# Patient Record
Sex: MALE | Race: WHITE | NOT HISPANIC OR LATINO | Employment: OTHER | URBAN - METROPOLITAN AREA
[De-identification: names, ages, dates, MRNs, and addresses within clinical notes are randomized per-mention and may not be internally consistent; named-entity substitution may affect disease eponyms.]

---

## 2023-08-26 PROBLEM — H57.9 ALLERGIC EYE REACTION: Status: ACTIVE | Noted: 2023-08-26

## 2023-08-26 PROBLEM — L30.9 ECZEMA: Status: ACTIVE | Noted: 2023-08-26

## 2023-08-26 PROBLEM — E87.1 HYPONATREMIA: Status: ACTIVE | Noted: 2023-08-26

## 2023-08-26 PROBLEM — Z86.010 HISTORY OF COLONIC POLYPS: Status: ACTIVE | Noted: 2023-08-26

## 2023-08-26 PROBLEM — I35.0 AORTIC STENOSIS, MILD: Status: ACTIVE | Noted: 2023-08-26

## 2023-08-26 PROBLEM — L03.119 CELLULITIS OF LOWER EXTREMITY: Status: ACTIVE | Noted: 2023-08-26

## 2023-08-26 PROBLEM — M26.629 TMJ ARTHRALGIA: Status: ACTIVE | Noted: 2023-08-26

## 2023-08-26 PROBLEM — B35.1 TINEA UNGUIUM: Status: ACTIVE | Noted: 2017-01-20

## 2023-08-26 PROBLEM — E66.812 CLASS 2 OBESITY: Status: ACTIVE | Noted: 2023-08-26

## 2023-08-26 PROBLEM — I48.3 TYPICAL ATRIAL FLUTTER (MULTI): Status: ACTIVE | Noted: 2023-08-26

## 2023-08-26 PROBLEM — I48.91 RAPID ATRIAL FIBRILLATION (MULTI): Status: ACTIVE | Noted: 2023-08-26

## 2023-08-26 PROBLEM — I27.20 PULMONARY HYPERTENSION (MULTI): Status: ACTIVE | Noted: 2023-08-26

## 2023-08-26 PROBLEM — S80.12XA CONTUSION OF LEFT LOWER LEG: Status: ACTIVE | Noted: 2023-08-26

## 2023-08-26 PROBLEM — H04.129 DRY EYE SYNDROME: Status: ACTIVE | Noted: 2023-08-26

## 2023-08-26 PROBLEM — E78.2 MIXED HYPERLIPIDEMIA: Status: ACTIVE | Noted: 2023-08-26

## 2023-08-26 PROBLEM — I71.40 ABDOMINAL AORTIC ANEURYSM (AAA) WITHOUT RUPTURE (CMS-HCC): Status: ACTIVE | Noted: 2023-08-26

## 2023-08-26 PROBLEM — B35.3 TINEA PEDIS OF BOTH FEET: Status: ACTIVE | Noted: 2017-01-20

## 2023-08-26 PROBLEM — M19.90 DEGENERATIVE JOINT DISEASE: Status: ACTIVE | Noted: 2023-08-26

## 2023-08-26 PROBLEM — I10 BENIGN ESSENTIAL HYPERTENSION: Status: ACTIVE | Noted: 2023-08-26

## 2023-08-26 PROBLEM — I10 HTN (HYPERTENSION): Status: ACTIVE | Noted: 2023-08-26

## 2023-08-26 PROBLEM — N40.0 BENIGN PROSTATIC HYPERPLASIA WITHOUT URINARY OBSTRUCTION: Status: ACTIVE | Noted: 2023-08-26

## 2023-08-26 PROBLEM — G47.19 EXCESSIVE DAYTIME SLEEPINESS: Status: ACTIVE | Noted: 2023-08-26

## 2023-08-26 PROBLEM — E66.9 CLASS 2 OBESITY: Status: ACTIVE | Noted: 2023-08-26

## 2023-08-26 PROBLEM — G47.30 SLEEP-DISORDERED BREATHING: Status: ACTIVE | Noted: 2023-08-26

## 2023-08-26 PROBLEM — I34.0 MILD MITRAL REGURGITATION: Status: ACTIVE | Noted: 2023-08-26

## 2023-08-26 PROBLEM — I49.3 PREMATURE VENTRICULAR CONTRACTIONS: Status: ACTIVE | Noted: 2023-08-26

## 2023-08-26 PROBLEM — I71.60 THORACOABDOMINAL AORTIC ANEURYSM (CMS-HCC): Status: ACTIVE | Noted: 2023-08-26

## 2023-08-26 PROBLEM — G47.33 OSA (OBSTRUCTIVE SLEEP APNEA): Status: ACTIVE | Noted: 2023-08-26

## 2023-08-26 PROBLEM — L85.3 XEROSIS CUTIS: Status: ACTIVE | Noted: 2017-01-20

## 2023-08-26 PROBLEM — D69.6 THROMBOCYTOPENIA (CMS-HCC): Status: ACTIVE | Noted: 2023-08-26

## 2023-08-26 PROBLEM — S61.210A LACERATION OF RIGHT INDEX FINGER, INITIAL ENCOUNTER: Status: ACTIVE | Noted: 2023-08-26

## 2023-08-26 PROBLEM — I49.9 CARDIAC RHYTHM DISORDER OR DISTURBANCE OR CHANGE: Status: ACTIVE | Noted: 2023-08-26

## 2023-08-26 PROBLEM — I48.0 PAROXYSMAL ATRIAL FIBRILLATION (MULTI): Status: ACTIVE | Noted: 2023-08-26

## 2023-08-26 PROBLEM — R09.89 PULSE IRREGULARITY: Status: ACTIVE | Noted: 2023-08-26

## 2023-08-26 PROBLEM — L08.9 INFECTION OF SKIN: Status: ACTIVE | Noted: 2023-08-26

## 2023-08-26 PROBLEM — S20.212A CONTUSION OF RIB ON LEFT SIDE: Status: ACTIVE | Noted: 2023-08-26

## 2023-08-26 PROBLEM — N52.9 ERECTILE DYSFUNCTION: Status: ACTIVE | Noted: 2023-08-26

## 2023-08-26 PROBLEM — S90.02XA: Status: ACTIVE | Noted: 2023-08-26

## 2023-08-26 PROBLEM — R91.8 MULTIPLE PULMONARY NODULES: Status: ACTIVE | Noted: 2023-08-26

## 2023-08-26 PROBLEM — J30.9 ALLERGIC RHINITIS DUE TO ALLERGEN: Status: ACTIVE | Noted: 2023-08-26

## 2023-08-26 PROBLEM — I51.7 CARDIOMEGALY: Status: ACTIVE | Noted: 2023-08-26

## 2023-08-26 PROBLEM — Z86.0100 HISTORY OF COLONIC POLYPS: Status: ACTIVE | Noted: 2023-08-26

## 2023-08-26 PROBLEM — K64.8 INTERNAL HEMORRHOIDS: Status: ACTIVE | Noted: 2023-08-26

## 2023-08-26 RX ORDER — LOSARTAN POTASSIUM 50 MG/1
50 TABLET ORAL DAILY
COMMUNITY
Start: 2022-04-22 | End: 2023-10-27 | Stop reason: SDUPTHER

## 2023-08-26 RX ORDER — METOPROLOL TARTRATE 100 MG/1
50 TABLET ORAL 3 TIMES DAILY
COMMUNITY
Start: 2017-12-01 | End: 2023-10-27 | Stop reason: SDUPTHER

## 2023-08-26 RX ORDER — AMIODARONE HYDROCHLORIDE 100 MG/1
100 TABLET ORAL
COMMUNITY
End: 2023-10-27 | Stop reason: SDUPTHER

## 2023-08-26 RX ORDER — SILDENAFIL 100 MG/1
100 TABLET, FILM COATED ORAL DAILY PRN
COMMUNITY
Start: 2022-04-22 | End: 2023-11-21 | Stop reason: ALTCHOICE

## 2023-08-26 RX ORDER — HYDROCORTISONE VALERATE CREAM 2 MG/G
1 CREAM TOPICAL 3 TIMES DAILY
COMMUNITY
Start: 2019-02-08 | End: 2023-10-27 | Stop reason: WASHOUT

## 2023-08-26 RX ORDER — METOPROLOL TARTRATE 50 MG/1
50 TABLET ORAL 3 TIMES DAILY
COMMUNITY
End: 2023-10-27 | Stop reason: SDUPTHER

## 2023-08-26 RX ORDER — MUPIROCIN 20 MG/G
OINTMENT TOPICAL
COMMUNITY
Start: 2023-02-01 | End: 2023-10-27 | Stop reason: WASHOUT

## 2023-08-26 RX ORDER — AMIODARONE HYDROCHLORIDE 200 MG/1
200 TABLET ORAL
COMMUNITY
Start: 2021-10-22 | End: 2023-10-27 | Stop reason: SDUPTHER

## 2023-08-26 RX ORDER — ACETAMINOPHEN 500 MG
500 TABLET ORAL EVERY 4 HOURS PRN
COMMUNITY
Start: 2020-12-08 | End: 2024-05-03 | Stop reason: SDUPTHER

## 2023-08-26 RX ORDER — ACETAMINOPHEN, ASPIRIN (NSAID), AND CAFFEINE 250; 250; 65 MG/1; MG/1; MG/1
2 TABLET, FILM COATED ORAL DAILY
COMMUNITY
Start: 2021-11-09 | End: 2024-05-03 | Stop reason: ALTCHOICE

## 2023-08-26 RX ORDER — PRAVASTATIN SODIUM 40 MG/1
40 TABLET ORAL DAILY
COMMUNITY
Start: 2021-10-22 | End: 2023-10-27 | Stop reason: SDUPTHER

## 2023-08-26 RX ORDER — APIXABAN 5 MG/1
5 TABLET, FILM COATED ORAL 2 TIMES DAILY
COMMUNITY
End: 2023-10-27 | Stop reason: SDUPTHER

## 2023-08-26 RX ORDER — KETOCONAZOLE 20 MG/G
1 CREAM TOPICAL
COMMUNITY
Start: 2017-01-20 | End: 2023-10-27 | Stop reason: WASHOUT

## 2023-08-26 RX ORDER — WARFARIN 4 MG/1
TABLET ORAL
COMMUNITY
Start: 2017-12-06 | End: 2023-10-27 | Stop reason: WASHOUT

## 2023-10-27 ENCOUNTER — OFFICE VISIT (OUTPATIENT)
Dept: PRIMARY CARE | Facility: CLINIC | Age: 73
End: 2023-10-27
Payer: MEDICARE

## 2023-10-27 VITALS
SYSTOLIC BLOOD PRESSURE: 138 MMHG | OXYGEN SATURATION: 98 % | TEMPERATURE: 97.7 F | WEIGHT: 252 LBS | BODY MASS INDEX: 36.16 KG/M2 | HEART RATE: 49 BPM | DIASTOLIC BLOOD PRESSURE: 60 MMHG

## 2023-10-27 DIAGNOSIS — Z79.899 LONG TERM CURRENT USE OF AMIODARONE: ICD-10-CM

## 2023-10-27 DIAGNOSIS — Z12.12 ENCOUNTER FOR COLORECTAL CANCER SCREENING: ICD-10-CM

## 2023-10-27 DIAGNOSIS — N40.0 BENIGN PROSTATIC HYPERPLASIA WITHOUT URINARY OBSTRUCTION: ICD-10-CM

## 2023-10-27 DIAGNOSIS — Z00.00 ANNUAL PHYSICAL EXAM: Primary | ICD-10-CM

## 2023-10-27 DIAGNOSIS — I48.0 PAROXYSMAL ATRIAL FIBRILLATION (MULTI): ICD-10-CM

## 2023-10-27 DIAGNOSIS — Z79.01 LONG TERM CURRENT USE OF ANTICOAGULANT: ICD-10-CM

## 2023-10-27 DIAGNOSIS — G47.33 OSA (OBSTRUCTIVE SLEEP APNEA): ICD-10-CM

## 2023-10-27 DIAGNOSIS — E03.8 SUBCLINICAL HYPOTHYROIDISM: ICD-10-CM

## 2023-10-27 DIAGNOSIS — Z12.11 ENCOUNTER FOR COLORECTAL CANCER SCREENING: ICD-10-CM

## 2023-10-27 DIAGNOSIS — M15.9 PRIMARY OSTEOARTHRITIS INVOLVING MULTIPLE JOINTS: ICD-10-CM

## 2023-10-27 DIAGNOSIS — Z23 ENCOUNTER FOR IMMUNIZATION: ICD-10-CM

## 2023-10-27 DIAGNOSIS — Z01.89 ENCOUNTER FOR ROUTINE LABORATORY TESTING: ICD-10-CM

## 2023-10-27 DIAGNOSIS — E78.2 MIXED HYPERLIPIDEMIA: ICD-10-CM

## 2023-10-27 DIAGNOSIS — E55.9 VITAMIN D DEFICIENCY: ICD-10-CM

## 2023-10-27 DIAGNOSIS — I10 ESSENTIAL HYPERTENSION: ICD-10-CM

## 2023-10-27 DIAGNOSIS — R73.9 HYPERGLYCEMIA: ICD-10-CM

## 2023-10-27 DIAGNOSIS — D69.6 THROMBOCYTOPENIA (CMS-HCC): ICD-10-CM

## 2023-10-27 DIAGNOSIS — I27.20 PULMONARY HYPERTENSION (MULTI): ICD-10-CM

## 2023-10-27 DIAGNOSIS — Z71.89 COUNSELING REGARDING ADVANCED CARE PLANNING AND GOALS OF CARE: ICD-10-CM

## 2023-10-27 DIAGNOSIS — Z12.5 ENCOUNTER FOR PROSTATE CANCER SCREENING: ICD-10-CM

## 2023-10-27 PROBLEM — I48.91 RAPID ATRIAL FIBRILLATION (MULTI): Status: RESOLVED | Noted: 2023-08-26 | Resolved: 2023-10-27

## 2023-10-27 PROBLEM — S20.212A CONTUSION OF RIB ON LEFT SIDE: Status: RESOLVED | Noted: 2023-08-26 | Resolved: 2023-10-27

## 2023-10-27 PROBLEM — M19.90 DEGENERATIVE JOINT DISEASE: Status: RESOLVED | Noted: 2023-08-26 | Resolved: 2023-10-27

## 2023-10-27 PROBLEM — R09.89 PULSE IRREGULARITY: Status: RESOLVED | Noted: 2023-08-26 | Resolved: 2023-10-27

## 2023-10-27 PROBLEM — G47.30 SLEEP-DISORDERED BREATHING: Status: RESOLVED | Noted: 2023-08-26 | Resolved: 2023-10-27

## 2023-10-27 PROBLEM — I51.7 CARDIOMEGALY: Status: RESOLVED | Noted: 2023-08-26 | Resolved: 2023-10-27

## 2023-10-27 PROBLEM — L03.119 CELLULITIS OF LOWER EXTREMITY: Status: RESOLVED | Noted: 2023-08-26 | Resolved: 2023-10-27

## 2023-10-27 PROBLEM — M15.0 PRIMARY OSTEOARTHRITIS INVOLVING MULTIPLE JOINTS: Status: ACTIVE | Noted: 2023-10-27

## 2023-10-27 PROBLEM — G47.19 EXCESSIVE DAYTIME SLEEPINESS: Status: RESOLVED | Noted: 2023-08-26 | Resolved: 2023-10-27

## 2023-10-27 PROBLEM — I49.9 CARDIAC RHYTHM DISORDER OR DISTURBANCE OR CHANGE: Status: RESOLVED | Noted: 2023-08-26 | Resolved: 2023-10-27

## 2023-10-27 PROBLEM — S80.12XA CONTUSION OF LEFT LOWER LEG: Status: RESOLVED | Noted: 2023-08-26 | Resolved: 2023-10-27

## 2023-10-27 PROBLEM — L08.9 INFECTION OF SKIN: Status: RESOLVED | Noted: 2023-08-26 | Resolved: 2023-10-27

## 2023-10-27 PROBLEM — S90.02XA: Status: RESOLVED | Noted: 2023-08-26 | Resolved: 2023-10-27

## 2023-10-27 PROCEDURE — 1159F MED LIST DOCD IN RCRD: CPT | Performed by: STUDENT IN AN ORGANIZED HEALTH CARE EDUCATION/TRAINING PROGRAM

## 2023-10-27 PROCEDURE — 1160F RVW MEDS BY RX/DR IN RCRD: CPT | Performed by: STUDENT IN AN ORGANIZED HEALTH CARE EDUCATION/TRAINING PROGRAM

## 2023-10-27 PROCEDURE — G0008 ADMIN INFLUENZA VIRUS VAC: HCPCS | Performed by: STUDENT IN AN ORGANIZED HEALTH CARE EDUCATION/TRAINING PROGRAM

## 2023-10-27 PROCEDURE — G0439 PPPS, SUBSEQ VISIT: HCPCS | Performed by: STUDENT IN AN ORGANIZED HEALTH CARE EDUCATION/TRAINING PROGRAM

## 2023-10-27 PROCEDURE — 1036F TOBACCO NON-USER: CPT | Performed by: STUDENT IN AN ORGANIZED HEALTH CARE EDUCATION/TRAINING PROGRAM

## 2023-10-27 PROCEDURE — 1126F AMNT PAIN NOTED NONE PRSNT: CPT | Performed by: STUDENT IN AN ORGANIZED HEALTH CARE EDUCATION/TRAINING PROGRAM

## 2023-10-27 PROCEDURE — 3078F DIAST BP <80 MM HG: CPT | Performed by: STUDENT IN AN ORGANIZED HEALTH CARE EDUCATION/TRAINING PROGRAM

## 2023-10-27 PROCEDURE — 1170F FXNL STATUS ASSESSED: CPT | Performed by: STUDENT IN AN ORGANIZED HEALTH CARE EDUCATION/TRAINING PROGRAM

## 2023-10-27 PROCEDURE — 90662 IIV NO PRSV INCREASED AG IM: CPT | Performed by: STUDENT IN AN ORGANIZED HEALTH CARE EDUCATION/TRAINING PROGRAM

## 2023-10-27 PROCEDURE — 3075F SYST BP GE 130 - 139MM HG: CPT | Performed by: STUDENT IN AN ORGANIZED HEALTH CARE EDUCATION/TRAINING PROGRAM

## 2023-10-27 RX ORDER — AMIODARONE HYDROCHLORIDE 200 MG/1
TABLET ORAL
Start: 2023-10-27 | End: 2024-05-03 | Stop reason: SDUPTHER

## 2023-10-27 RX ORDER — TRIAMCINOLONE ACETONIDE 1 MG/G
CREAM TOPICAL
COMMUNITY
Start: 2023-07-24

## 2023-10-27 RX ORDER — PRAVASTATIN SODIUM 40 MG/1
40 TABLET ORAL NIGHTLY
Start: 2023-10-27 | End: 2023-11-29 | Stop reason: SDUPTHER

## 2023-10-27 RX ORDER — LOSARTAN POTASSIUM 50 MG/1
50 TABLET ORAL DAILY
Start: 2023-10-27 | End: 2024-02-28

## 2023-10-27 RX ORDER — VIT C/E/ZN/COPPR/LUTEIN/ZEAXAN 250MG-90MG
25 CAPSULE ORAL DAILY
Start: 2023-10-27 | End: 2024-05-03 | Stop reason: SDUPTHER

## 2023-10-27 RX ORDER — METOPROLOL TARTRATE 50 MG/1
50 TABLET ORAL 3 TIMES DAILY
Start: 2023-10-27 | End: 2024-05-03 | Stop reason: ALTCHOICE

## 2023-10-27 ASSESSMENT — ACTIVITIES OF DAILY LIVING (ADL)
TAKING_MEDICATION: INDEPENDENT
DRESSING: INDEPENDENT
MANAGING_FINANCES: INDEPENDENT
GROCERY_SHOPPING: INDEPENDENT
BATHING: INDEPENDENT
DOING_HOUSEWORK: INDEPENDENT

## 2023-10-27 ASSESSMENT — ENCOUNTER SYMPTOMS
NEUROLOGICAL NEGATIVE: 1
CONSTITUTIONAL NEGATIVE: 1
ALLERGIC/IMMUNOLOGIC NEGATIVE: 1
RESPIRATORY NEGATIVE: 1
GASTROINTESTINAL NEGATIVE: 1
HEMATOLOGIC/LYMPHATIC NEGATIVE: 1
PSYCHIATRIC NEGATIVE: 1
EYES NEGATIVE: 1
CARDIOVASCULAR NEGATIVE: 1
MUSCULOSKELETAL NEGATIVE: 1
ENDOCRINE NEGATIVE: 1

## 2023-10-27 ASSESSMENT — PAIN SCALES - GENERAL: PAINLEVEL: 0-NO PAIN

## 2023-10-27 NOTE — PATIENT INSTRUCTIONS
Diagnoses and all orders for this visit:  Annual physical exam  Encounter for routine laboratory testing  Comments:  Patient had labwork done for this appointment. Discussed today.  Will order labwork for next appointment.  Orders:  -     CBC; Future  -     Basic Metabolic Panel; Future  -     Hepatic Function Panel; Future  -     Lipid Panel; Future  -     Hemoglobin A1C; Future  -     Vitamin D 25-Hydroxy,Total (for eval of Vitamin D levels); Future  -     Prostate Specific Antigen; Future  -     TSH with reflex to Free T4 if abnormal; Future  Hyperglycemia  -     Hemoglobin A1C; Future  Encounter for prostate cancer screening  -     Prostate Specific Antigen; Future  Encounter for colorectal cancer screening  Encounter for immunization  Comments:  Encouraged shingles (shingrix) immunizations.  Orders:  -     Flu vaccine, quadrivalent, high-dose, preservative free, age 65y+ (FLUZONE)  Counseling regarding advanced care planning and goals of care  Comments:  The patient was encouraged to ensure that any/all documentation is accurate, and that our office be provided a copy in the event that anything changes.  Essential hypertension  Comments:  Looks great, no changes at this time.  Orders:  -     losartan (Cozaar) 50 mg tablet; Take 1 tablet (50 mg) by mouth once daily.  -     metoprolol tartrate (Lopressor) 50 mg tablet; Take 1 tablet by mouth 3 times a day.  -     Follow Up In Primary Care; Future  JAYDEN (obstructive sleep apnea)  -     Follow Up In Primary Care; Future  Pulmonary hypertension (CMS/HCC)  -     Follow Up In Primary Care; Future  Mixed hyperlipidemia  -     pravastatin (Pravachol) 40 mg tablet; Take 1 tablet (40 mg) by mouth once daily at bedtime.  -     Lipid Panel; Future  Subclinical hypothyroidism  -     TSH with reflex to Free T4 if abnormal; Future  Benign prostatic hyperplasia without urinary obstruction  -     Prostate Specific Antigen; Future  -     Follow Up In Primary Care; Future  Primary  osteoarthritis involving multiple joints  -     Follow Up In Primary Care; Future  Paroxysmal atrial fibrillation (CMS/HCC)  -     amiodarone (Pacerone) 200 mg tablet; 1 tablet once/day every Tuesday, Thursday, Saturday, and Sunday.  -     apixaban (Eliquis) 5 mg tablet; Take 1 tablet (5 mg) by mouth 2 times a day.  -     metoprolol tartrate (Lopressor) 50 mg tablet; Take 1 tablet by mouth 3 times a day.  -     Follow Up In Primary Care; Future  Long term current use of anticoagulant  -     apixaban (Eliquis) 5 mg tablet; Take 1 tablet (5 mg) by mouth 2 times a day.  -     Follow Up In Primary Care; Future  Long term current use of amiodarone  -     amiodarone (Pacerone) 200 mg tablet; 1 tablet once/day every Tuesday, Thursday, Saturday, and Sunday.  -     Follow Up In Primary Care; Future  Thrombocytopenia (CMS/HCC)  -     CBC; Future  Vitamin D deficiency  -     Vitamin D 25-Hydroxy,Total (for eval of Vitamin D levels); Future  -     cholecalciferol (Vitamin D3) 25 MCG (1000 UT) capsule; Take 1 capsule (25 mcg) by mouth once daily.

## 2023-10-27 NOTE — PROGRESS NOTES
[facility]: [facility]  MEDICARE WELLNESS EXAM      Anderson Barlow is a 73 y.o. male that is presenting today for Annual Exam (CPE).    Assessment/Plan    Diagnoses and all orders for this visit:  Annual physical exam  Encounter for routine laboratory testing  Comments:  Patient had labwork done for this appointment. Discussed today.  Will order labwork for next appointment.  Orders:  -     CBC; Future  -     Basic Metabolic Panel; Future  -     Hepatic Function Panel; Future  -     Lipid Panel; Future  -     Hemoglobin A1C; Future  -     Vitamin D 25-Hydroxy,Total (for eval of Vitamin D levels); Future  -     Prostate Specific Antigen; Future  -     TSH with reflex to Free T4 if abnormal; Future  Hyperglycemia  -     Hemoglobin A1C; Future  Encounter for prostate cancer screening  -     Prostate Specific Antigen; Future  Encounter for colorectal cancer screening  Encounter for immunization  Comments:  Encouraged shingles (shingrix) immunizations.  Orders:  -     Flu vaccine, quadrivalent, high-dose, preservative free, age 65y+ (FLUZONE)  Counseling regarding advanced care planning and goals of care  Comments:  The patient was encouraged to ensure that any/all documentation is accurate, and that our office be provided a copy in the event that anything changes.  Essential hypertension  Comments:  Looks great, no changes at this time.  Orders:  -     losartan (Cozaar) 50 mg tablet; Take 1 tablet (50 mg) by mouth once daily.  -     metoprolol tartrate (Lopressor) 50 mg tablet; Take 1 tablet by mouth 3 times a day.  JAYDEN (obstructive sleep apnea)  Pulmonary hypertension (CMS/HCC)  Mixed hyperlipidemia  -     pravastatin (Pravachol) 40 mg tablet; Take 1 tablet (40 mg) by mouth once daily at bedtime.  -     Lipid Panel; Future  Subclinical hypothyroidism  -     TSH with reflex to Free T4 if abnormal; Future  Benign prostatic hyperplasia without urinary obstruction  -     Prostate Specific Antigen;  Future  Primary osteoarthritis involving multiple joints  Paroxysmal atrial fibrillation (CMS/[facility])  -     amiodarone (Pacerone) 200 mg tablet; 1 tablet once/day every Tuesday, Thursday, Saturday, and Sunday.  -     apixaban (Eliquis) 5 mg tablet; Take 1 tablet (5 mg) by mouth 2 times a day.  -     metoprolol tartrate (Lopressor) 50 mg tablet; Take 1 tablet by mouth 3 times a day.  Long term current use of anticoagulant  -     apixaban (Eliquis) 5 mg tablet; Take 1 tablet (5 mg) by mouth 2 times a day.  Long term current use of amiodarone  -     amiodarone (Pacerone) 200 mg tablet; 1 tablet once/day every Tuesday, Thursday, Saturday, and Sunday.  Thrombocytopenia (CMS/[facility])  -     CBC; Future  Vitamin D deficiency  -     Vitamin D 25-Hydroxy,Total (for eval of Vitamin D levels); Future  -     cholecalciferol (Vitamin D3) 25 MCG (1000 UT) capsule; Take 1 capsule (25 mcg) by mouth once daily.    ADVANCED CARE PLANNING  Advanced Care Planning was discussed with patient:  The patient has an active advanced care plan on file. The patient has an active surrogate decision-maker on file.  The patient was encouraged to ensure that any/all documentation is accurate and up to date, and that our office be provided a copy in the event that anything changes.    ACTIVITIES OF DAILY LIVING  Basic ADLs:  Bathing: Independent, Dressing: Independent, Toileting: Independent, Transferring: Independent, Continence: Independent, Feeding: Independent.    Instrumental ADLs:  Ability to use phone: Independent, Shopping: Independent, Cooking: Independent, House-keeping: Independent, Laundry: Independent, Transportation: Independent, Medication Management: Independent, Finance Management: Independent.    Subjective   - The patient otherwise feels well and denies any acute symptoms or concerns at this time.  - The patient denies any changes or progression of their chronic medical problems.  - The patient denies any problems or concerns with their  medications.      Review of Systems   Constitutional: Negative.    HENT: Negative.     Eyes: Negative.    Respiratory: Negative.     Cardiovascular: Negative.    Gastrointestinal: Negative.    Endocrine: Negative.    Genitourinary: Negative.    Musculoskeletal: Negative.    Skin: Negative.    Allergic/Immunologic: Negative.    Neurological: Negative.    Hematological: Negative.    Psychiatric/Behavioral: Negative.     All other systems reviewed and are negative.    Objective   Vitals:    10/27/23 0937   BP: 138/60   Pulse: (!) 49   Temp: 36.5 °C (97.7 °F)   SpO2: 98%      Body mass index is 36.16 kg/m².  Physical Exam  Vitals and nursing note reviewed.   Constitutional:       General: He is not in acute distress.     Appearance: Normal appearance. He is not ill-appearing.   HENT:      Head: Normocephalic and atraumatic.      Right Ear: Tympanic membrane, ear canal and external ear normal. There is no impacted cerumen.      Left Ear: Tympanic membrane, ear canal and external ear normal. There is no impacted cerumen.      Nose: Nose normal.      Mouth/Throat:      Mouth: Mucous membranes are moist.      Pharynx: Oropharynx is clear. No oropharyngeal exudate or posterior oropharyngeal erythema.   Eyes:      General: No scleral icterus.        Right eye: No discharge.         Left eye: No discharge.      Extraocular Movements: Extraocular movements intact.      Conjunctiva/sclera: Conjunctivae normal.      Pupils: Pupils are equal, round, and reactive to light.   Neck:      Vascular: No carotid bruit.   Cardiovascular:      Rate and Rhythm: Normal rate and regular rhythm.      Pulses: Normal pulses.      Heart sounds: Normal heart sounds. No murmur heard.     No friction rub. No gallop.   Pulmonary:      Effort: Pulmonary effort is normal. No respiratory distress.      Breath sounds: Normal breath sounds.   Abdominal:      General: Abdomen is flat. Bowel sounds are normal.      Palpations: Abdomen is soft.       "Tenderness: There is no abdominal tenderness.      Hernia: No hernia is present.   Musculoskeletal:         General: No swelling. Normal range of motion.      Cervical back: Normal range of motion.   Lymphadenopathy:      Cervical: No cervical adenopathy.   Skin:     General: Skin is warm and dry.      Coloration: Skin is not jaundiced.      Findings: No rash.   Neurological:      General: No focal deficit present.      Mental Status: He is alert and oriented to person, place, and time. Mental status is at baseline.   Psychiatric:         Mood and Affect: Mood normal.         Behavior: Behavior normal.       Diagnostic Results   Lab Results   Component Value Date    GLUCOSE 142 (H) 04/12/2023    CALCIUM 8.8 04/12/2023     04/12/2023    K 4.1 04/12/2023    CO2 23 (L) 04/12/2023     04/12/2023    BUN 18 04/12/2023    CREATININE 1.1 04/12/2023     Lab Results   Component Value Date    ALT 18 04/12/2023    AST 21 04/12/2023    ALKPHOS 98 04/12/2023    BILITOT 0.4 04/12/2023     Lab Results   Component Value Date    WBC 7.0 02/07/2023    HGB 14.6 02/07/2023    HCT 45.6 02/07/2023    MCV 93.6 02/07/2023     02/07/2023     Lab Results   Component Value Date    CHOL 158 03/03/2021    CHOL 163 01/06/2021     Lab Results   Component Value Date    HDL 37 (L) 03/03/2021    HDL 40 (L) 01/06/2021     Lab Results   Component Value Date    LDLCALC 97 03/03/2021    LDLCALC 99 01/06/2021     Lab Results   Component Value Date    TRIG 122 03/03/2021    TRIG 119 01/06/2021     No components found for: \"CHOLHDL\"  No results found for: \"HGBA1C\"  Other labs not included in the list above reviewed either before or during this encounter.    History   History reviewed. No pertinent past medical history.  Past Surgical History:   Procedure Laterality Date    CATARACT EXTRACTION  06/23/2016    Cataract Surgery    COLONOSCOPY  06/23/2016    Complete Colonoscopy    CT ABDOMEN PELVIS ANGIOGRAM W AND/OR WO IV CONTRAST  11/4/2020 " "   CT ABDOMEN PELVIS ANGIOGRAM W AND/OR WO IV CONTRAST [facility]    HEMORRHOID SURGERY  06/23/2016    Hemorrhoidectomy    OTHER SURGICAL HISTORY  03/28/2022    Abdominal aortic aneurysm repair     Family History   Problem Relation Name Age of Onset    Alzheimer's disease Mother      Liver cancer Other SIBLINGS      Social History     Socioeconomic History    Marital status:      Spouse name: Not on file    Number of children: Not on file    Years of education: Not on file    Highest education level: Not on file   Occupational History    Not on file   Tobacco Use    Smoking status: Former     Types: Cigarettes    Smokeless tobacco: Never    Tobacco comments:     Pt quit about 10 years ago   Substance and Sexual Activity    Alcohol use: Not Currently    Drug use: Never    Sexual activity: Not on file   Other Topics Concern    Not on file   Social History Narrative    Not on file     Social Determinants of Health     Financial Resource Strain: Not on file   Food Insecurity: Not on file   Transportation Needs: Not on file   Physical Activity: Not on file   Stress: Not on file   Social Connections: Not on file   Intimate Partner Violence: Not on file   Housing Stability: Not on file     Allergies   Allergen Reactions    Codeine Unknown    Hydrocodone Other     MENTAL STATUS CHANGE    Hydrocodone-Acetaminophen Other     AMS    Lisinopril Other and Unknown     CHOKING    Oxycodone-Acetaminophen Unknown    Succinylcholine Other and Unknown     \"CAUSED FATHER'S PROSTATE CANCER\"    Cephalexin Diarrhea and Palpitations     VOMITING  HEART RACING  FACE FLUSHING    Penicillins Rash     Current Outpatient Medications on File Prior to Visit   Medication Sig Dispense Refill    acetaminophen (Tylenol Extra Strength) 500 mg tablet Take 1 tablet (500 mg) by mouth every 4 hours if needed.      aspirin-acetaminophen-caffeine (Excedrin Extra Strength) 250-250-65 mg tablet Take 2 tablets by mouth once daily.      " sildenafil (Viagra) 100 mg tablet Take 1 tablet (100 mg) by mouth once daily as needed.      triamcinolone (Kenalog) 0.1 % cream APPLY TWICE DAILY TO RASH UP TO 2 WEEKS/MONTH AS NEEDED.      [DISCONTINUED] amiodarone (Pacerone) 200 mg tablet Take 1 tablet (200 mg) by mouth. EVERY TUESDAY, THURSDAY, SATURDAY, SUNDAY      [DISCONTINUED] Eliquis 5 mg tablet Take 1 tablet (5 mg) by mouth 2 times a day.      [DISCONTINUED] losartan (Cozaar) 50 mg tablet Take 1 tablet (50 mg) by mouth once daily.      [DISCONTINUED] metoprolol tartrate (Lopressor) 50 mg tablet Take 1 tablet by mouth 3 times a day.      [DISCONTINUED] pravastatin (Pravachol) 40 mg tablet Take 1 tablet (40 mg) by mouth once daily.      [DISCONTINUED] amiodarone (Pacerone) 100 mg tablet Take 1 tablet (100 mg) by mouth. EVERY MONDAY, WEDNESDAY, FRIDAY      [DISCONTINUED] hydrocortisone ([place]) 0.2 % cream Apply 1 Application topically 3 times a day. APPLY SPARINGLY TO AFFECTED AREA      [DISCONTINUED] ketoconazole (NIZOral) 2 % cream 1 Application.      [DISCONTINUED] metoprolol tartrate (Lopressor) 100 mg tablet Take 0.5 tablets (50 mg) by mouth 3 times a day.      [DISCONTINUED] mupirocin (Bactroban) 2 % ointment       [DISCONTINUED] warfarin (Coumadin) 4 mg tablet Take by mouth. TAKE as DIRECTED       No current facility-administered medications on file prior to visit.     Immunization History   Administered Date(s) Administered    Flu vaccine, quadrivalent, high-dose, preservative free, age 65y+ (FLUZONE) 10/22/2021    Influenza, High Dose Seasonal, Preservative Free 10/19/2016, 02/09/2017, 11/05/2019    Influenza, Seasonal, Quadrivalent, Adjuvanted 10/13/2020    Influenza, seasonal, injectable 11/24/2007, 09/03/2009, 11/11/2010, 10/31/2011, 09/25/2012, 10/23/2013, 11/12/2014, 11/12/2015, 10/24/2016    Influenza, trivalent, adjuvanted 11/20/2017, 10/29/2018    Pfizer Purple Cap SARS-CoV-2 03/05/2021, 04/02/2021, 01/01/2022, 01/20/2022    Pneumococcal  conjugate vaccine, 13-valent (PREVNAR 13) 08/17/2015    Pneumococcal polysaccharide vaccine, 23-valent, age 2 years and older (PNEUMOVAX 23) 10/23/2013    Td vaccine, age 7 years and older (TDVAX) 12/30/1994    Tdap vaccine, age 7 year and older (BOOSTRIX) 09/06/2012, 01/01/2014, 11/29/2017    Zoster, live 09/03/2009     Patient's medical history was reviewed and updated either before or during this encounter.    Vazquez Vu MD

## 2023-11-13 ENCOUNTER — HOSPITAL ENCOUNTER (EMERGENCY)
Facility: HOSPITAL | Age: 73
Discharge: HOME | End: 2023-11-13
Attending: EMERGENCY MEDICINE
Payer: MEDICARE

## 2023-11-13 ENCOUNTER — APPOINTMENT (OUTPATIENT)
Dept: RADIOLOGY | Facility: HOSPITAL | Age: 73
End: 2023-11-13
Payer: MEDICARE

## 2023-11-13 VITALS
DIASTOLIC BLOOD PRESSURE: 86 MMHG | HEIGHT: 70 IN | BODY MASS INDEX: 34.65 KG/M2 | SYSTOLIC BLOOD PRESSURE: 153 MMHG | WEIGHT: 242 LBS | TEMPERATURE: 97.7 F | RESPIRATION RATE: 16 BRPM | OXYGEN SATURATION: 98 % | HEART RATE: 57 BPM
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
## 2023-11-13 DIAGNOSIS — S09.90XA HEAD INJURY, INITIAL ENCOUNTER: ICD-10-CM

## 2023-11-13 DIAGNOSIS — S93.402A SPRAIN OF LEFT ANKLE, UNSPECIFIED LIGAMENT, INITIAL ENCOUNTER: ICD-10-CM

## 2023-11-13 PROCEDURE — 73610 X-RAY EXAM OF ANKLE: CPT | Mod: LT

## 2023-11-13 PROCEDURE — 73630 X-RAY EXAM OF FOOT: CPT | Mod: LT

## 2023-11-13 PROCEDURE — 99285 EMERGENCY DEPT VISIT HI MDM: CPT | Mod: 25 | Performed by: EMERGENCY MEDICINE

## 2023-11-13 PROCEDURE — 70450 CT HEAD/BRAIN W/O DYE: CPT

## 2023-11-13 PROCEDURE — 72125 CT NECK SPINE W/O DYE: CPT

## 2023-11-13 ASSESSMENT — PAIN - FUNCTIONAL ASSESSMENT: PAIN_FUNCTIONAL_ASSESSMENT: 0-10

## 2023-11-13 ASSESSMENT — PAIN SCALES - GENERAL: PAINLEVEL_OUTOF10: 8

## 2023-11-13 ASSESSMENT — PAIN DESCRIPTION - ORIENTATION: ORIENTATION: LEFT

## 2023-11-13 ASSESSMENT — COLUMBIA-SUICIDE SEVERITY RATING SCALE - C-SSRS
1. IN THE PAST MONTH, HAVE YOU WISHED YOU WERE DEAD OR WISHED YOU COULD GO TO SLEEP AND NOT WAKE UP?: NO
2. HAVE YOU ACTUALLY HAD ANY THOUGHTS OF KILLING YOURSELF?: NO
6. HAVE YOU EVER DONE ANYTHING, STARTED TO DO ANYTHING, OR PREPARED TO DO ANYTHING TO END YOUR LIFE?: NO

## 2023-11-13 ASSESSMENT — PAIN DESCRIPTION - PAIN TYPE: TYPE: ACUTE PAIN

## 2023-11-13 ASSESSMENT — PAIN DESCRIPTION - LOCATION: LOCATION: ANKLE

## 2023-11-13 ASSESSMENT — PAIN DESCRIPTION - FREQUENCY: FREQUENCY: CONSTANT/CONTINUOUS

## 2023-11-13 NOTE — Clinical Note
Anderson Barlow was seen and treated in our emergency department on 11/13/2023.  He may return to work on 11/14/2023.       If you have any questions or concerns, please don't hesitate to call.      Merrill Brennan PA-C

## 2023-11-13 NOTE — ED PROVIDER NOTES
HPI   Chief Complaint   Patient presents with    Fall     Pt comes in via ems from home, fall 30 min pta, tripped and fell down stairs, denies LOC, does take blood thinners, did hit his head, left ankle pain, reports dizziness, denies chest pain or sob. Pt is aox4 on arrival        HPI  73-year-old male here for evaluation of fall, head injury, left ankle injury.  Is on Eliquis, also amiodarone.  Says that he was walking on the stairs and thought he was on the ground level but there was apparently 1 more stair that he missed and when he stepped he twisted his left ankle and fell and then also hit his head, no loss of consciousness, unable to bear weight on the left ankle.  Has no other area of pain or injury.                  Campbell Coma Scale Score: 15                  Patient History   No past medical history on file.  Past Surgical History:   Procedure Laterality Date    CATARACT EXTRACTION  06/23/2016    Cataract Surgery    COLONOSCOPY  06/23/2016    Complete Colonoscopy    CT ABDOMEN PELVIS ANGIOGRAM W AND/OR WO IV CONTRAST  11/4/2020    CT ABDOMEN PELVIS ANGIOGRAM W AND/OR WO IV CONTRAST [facility]    HEMORRHOID SURGERY  06/23/2016    Hemorrhoidectomy    OTHER SURGICAL HISTORY  03/28/2022    Abdominal aortic aneurysm repair     Family History   Problem Relation Name Age of Onset    Alzheimer's disease Mother      Liver cancer Other SIBLINGS      Social History     Tobacco Use    Smoking status: Former     Types: Cigarettes    Smokeless tobacco: Never    Tobacco comments:     Pt quit about 10 years ago   Substance Use Topics    Alcohol use: Not Currently    Drug use: Never       Physical Exam   ED Triage Vitals [11/13/23 1040]   Temp Heart Rate Resp BP   36.5 °C (97.7 °F) 51 16 166/82      SpO2 Temp Source Heart Rate Source Patient Position   100 % Oral Monitor --      BP Location FiO2 (%)     -- --       Physical Exam  PHYSICAL EXAMINATION    GENERAL APPEARANCE: Awake and alert.     VITAL SIGNS: As  per the nurses' triage record.     HEENT: Normocephalic, atraumatic. Extraocular muscles are intact. Pupils equal round and reactive to light. Conjunctiva are pink. Negative scleral icterus. Mucous membranes are moist. Tongue in the midline. Pharynx was without erythema or exudates, uvula midline    NECK: Soft Nontender and supple, full gross ROM, no meningeal signs.    CHEST: Nontender to palpation. Clear to auscultation bilaterally. No rales, rhonchi, or wheezing.     HEART: S1, S2. Regular rate and rhythm. No murmurs, gallops or rubs.  Strong and equal pulses in the extremities.     ABDOMEN: Soft, nontender, nondistended, positive bowel sounds, no palpable masses.    MUSCULOSKELETAL: Left ankle tender diffusely, distal sensation function is intact, range of motion limited secondary to pain.  No pain in the tibial plateau around the knee.  Palpation of all the extremities otherwise elicits no pain with a stable pelvis     NEUROLOGICAL: Awake, alert and oriented x 3. Power intact in the upper and lower extremities. Sensation is intact to light touch in the upper and lower extremities.     IMMUNOLOGICAL: No lymphatic streaking noted     DERM: No petechiae, rashes, or ecchymoses.  ED Course & MDM   Diagnoses as of 11/13/23 1334   Fall, initial encounter   Head injury, initial encounter   Sprain of left ankle, unspecified ligament, initial encounter       Medical Decision Making  Patient has been seen in conjunction with attending physician Dr. Schmitt    Parts of this chart have been completed using voice recognition software. Please excuse any errors of transcription.  My thought process and reason for plan has been formulated from the time that I saw the patient until the time of disposition and is not specific to one specific moment during their visit and furthermore my MDM encompasses this entire chart and not only this text box.      HPI: Detailed above.    Exam: A medically appropriate exam performed,  outlined above, given the known history and presentation.    History obtained from: The patient    Social Determinants of Health considered during this visit: Lives at home    Medications given during visit:  Medications - No data to display     Diagnostic/tests  Labs Reviewed - No data to display   CT head wo IV contrast   Final Result   No CT evidence for acute intracranial pathology.        Signed by: Young Garcia 11/13/2023 1:21 PM   Dictation workstation:   [id number]      CT cervical spine wo IV contrast   Final Result   1. No acute fracture or spondylolisthesis.   2. Degenerative disc disease and spondylosis as described in the body   of the report.             Signed by: Young Garcia 11/13/2023 1:16 PM   Dictation workstation:   [id number]      XR ankle left 3+ views   Final Result   No evidence for acute osseous abnormality of the left ankle        Signed by: Young Garcia 11/13/2023 1:22 PM   Dictation workstation:   [id number]      XR foot left 3+ views   Final Result   No evidence for acute osseous abnormality of the left foot.        Signed by: Young Garcia 11/13/2023 1:24 PM   Dictation workstation:   [id number]           Considerations/further MDM:  I estimate there is low risk for severe head injury requiring admission or transfer to another facility.  I have considered: Fracture, dislocation, facial fracture injury, ocular involvement, cauda equina, central cord syndrome, epidural mass lesion, meningitis, spinal stenosis, disc herniation, intracranial hemorrhage or hematoma, cavernous thrombosis, stroke, concussion thus I consider the discharge disposition reasonable. We have discussed the diagnosis and risks, and we agree with discharging home to follow-up with their primary doctor, or specialist as provided. We also discussed returning to the Emergency Department immediately if new or worsening symptoms occur. We have discussed the symptoms which are most concerning (e.g.,  changing or worsening pain, weakness, vomiting, fever) that necessitate immediate return.      Procedure  Procedures     Merrill Brennan PA-C  11/13/23 2838

## 2023-11-15 ENCOUNTER — TELEPHONE (OUTPATIENT)
Dept: PRIMARY CARE | Facility: CLINIC | Age: 73
End: 2023-11-15
Payer: MEDICARE

## 2023-11-15 NOTE — TELEPHONE ENCOUNTER
Patient called office to follow up on ED visit over the weekend. States that he fell down some stairs and hurt his ankle. Patient states that his L ankle is still sore, but is weight bearing. Please advise.

## 2023-11-20 ENCOUNTER — HOSPITAL ENCOUNTER (EMERGENCY)
Facility: HOSPITAL | Age: 73
Discharge: HOME | End: 2023-11-20
Attending: STUDENT IN AN ORGANIZED HEALTH CARE EDUCATION/TRAINING PROGRAM
Payer: MEDICARE

## 2023-11-20 ENCOUNTER — APPOINTMENT (OUTPATIENT)
Dept: RADIOLOGY | Facility: HOSPITAL | Age: 73
End: 2023-11-20
Payer: MEDICARE

## 2023-11-20 VITALS
HEIGHT: 69 IN | HEART RATE: 57 BPM | TEMPERATURE: 98.6 F | OXYGEN SATURATION: 95 % | RESPIRATION RATE: 17 BRPM | SYSTOLIC BLOOD PRESSURE: 147 MMHG | BODY MASS INDEX: 37.13 KG/M2 | WEIGHT: 250.66 LBS | DIASTOLIC BLOOD PRESSURE: 75 MMHG

## 2023-11-20 DIAGNOSIS — M79.604 RIGHT LEG PAIN: Primary | ICD-10-CM

## 2023-11-20 PROCEDURE — 2500000001 HC RX 250 WO HCPCS SELF ADMINISTERED DRUGS (ALT 637 FOR MEDICARE OP): Performed by: STUDENT IN AN ORGANIZED HEALTH CARE EDUCATION/TRAINING PROGRAM

## 2023-11-20 PROCEDURE — 93971 EXTREMITY STUDY: CPT

## 2023-11-20 PROCEDURE — 99284 EMERGENCY DEPT VISIT MOD MDM: CPT | Mod: 25

## 2023-11-20 PROCEDURE — 99285 EMERGENCY DEPT VISIT HI MDM: CPT | Mod: 25 | Performed by: STUDENT IN AN ORGANIZED HEALTH CARE EDUCATION/TRAINING PROGRAM

## 2023-11-20 RX ORDER — OXYCODONE AND ACETAMINOPHEN 5; 325 MG/1; MG/1
1 TABLET ORAL EVERY 6 HOURS PRN
Qty: 6 TABLET | Refills: 0 | Status: SHIPPED | OUTPATIENT
Start: 2023-11-20 | End: 2023-11-22

## 2023-11-20 RX ORDER — OXYCODONE AND ACETAMINOPHEN 5; 325 MG/1; MG/1
1 TABLET ORAL ONCE
Status: COMPLETED | OUTPATIENT
Start: 2023-11-20 | End: 2023-11-20

## 2023-11-20 RX ORDER — BACLOFEN 20 MG/1
10 TABLET ORAL 3 TIMES DAILY PRN
Qty: 15 TABLET | Refills: 0 | Status: SHIPPED | OUTPATIENT
Start: 2023-11-20 | End: 2024-05-03 | Stop reason: ALTCHOICE

## 2023-11-20 RX ADMIN — OXYCODONE AND ACETAMINOPHEN 1 TABLET: 5; 325 TABLET ORAL at 18:44

## 2023-11-20 ASSESSMENT — COLUMBIA-SUICIDE SEVERITY RATING SCALE - C-SSRS
1. IN THE PAST MONTH, HAVE YOU WISHED YOU WERE DEAD OR WISHED YOU COULD GO TO SLEEP AND NOT WAKE UP?: NO
6. HAVE YOU EVER DONE ANYTHING, STARTED TO DO ANYTHING, OR PREPARED TO DO ANYTHING TO END YOUR LIFE?: NO
2. HAVE YOU ACTUALLY HAD ANY THOUGHTS OF KILLING YOURSELF?: NO

## 2023-11-20 ASSESSMENT — PAIN SCALES - GENERAL
PAINLEVEL_OUTOF10: 3
PAINLEVEL_OUTOF10: 2

## 2023-11-20 ASSESSMENT — PAIN - FUNCTIONAL ASSESSMENT
PAIN_FUNCTIONAL_ASSESSMENT: 0-10
PAIN_FUNCTIONAL_ASSESSMENT: 0-10

## 2023-11-20 NOTE — ED NOTES
Patient is resting in cart in a position of comfort. Patient's respirations are even and unlabored with symmetrical chest rise and fall observed. Patient is on CCM and pulse ox. Patient is medicated per MAR. Patient is alert and oriented x 4. Pulses present x 4. Call light is within reach of patient.        Stacey Burns RN  11/20/23 4830

## 2023-11-20 NOTE — ED PROVIDER NOTES
HPI   No chief complaint on file.      HPI     Patient is a 73-year-old male presenting for evaluation of right lower extremity pain for 5 days.  Patient states he injured his left ankle approximately a week ago and noticed increased pain in the right side of his leg several days afterwards.  The pain is located to the right calf and right thigh.  Worse with ambulation, better with rest.  Patient is on blood thinning medication.  No history recent travel.  No history of injury to the extremity.  No numbness or tingling to the extremity.               No data recorded                Patient History   No past medical history on file.  Past Surgical History:   Procedure Laterality Date    CATARACT EXTRACTION  06/23/2016    Cataract Surgery    COLONOSCOPY  06/23/2016    Complete Colonoscopy    CT ABDOMEN PELVIS ANGIOGRAM W AND/OR WO IV CONTRAST  11/4/2020    CT ABDOMEN PELVIS ANGIOGRAM W AND/OR WO IV CONTRAST [facility]    HEMORRHOID SURGERY  06/23/2016    Hemorrhoidectomy    OTHER SURGICAL HISTORY  03/28/2022    Abdominal aortic aneurysm repair     Family History   Problem Relation Name Age of Onset    Alzheimer's disease Mother      Liver cancer Other SIBLINGS      Social History     Tobacco Use    Smoking status: Former     Types: Cigarettes    Smokeless tobacco: Never    Tobacco comments:     Pt quit about 10 years ago   Substance Use Topics    Alcohol use: Not Currently    Drug use: Never       Physical Exam   ED Triage Vitals   Temp Pulse Resp BP   -- -- -- --      SpO2 Temp src Heart Rate Source Patient Position   -- -- -- --      BP Location FiO2 (%)     -- --       Physical Exam    GENERAL: Well nourished and well developed. Answers questions appropriately.    SKIN: Clean and dry without evidence of rashes.    HEENT: Skull normocephalic, atraumatic. No scleral icterus. PERRLA, with EOMI.  Mucous membranes moist and pink in color. Supple neck, trachea midline.    RESPIRATORY: Clear to ausculation with  normal effort. No adventitious sounds, intercostal retractions or shallow breathing.    CARDIOVASCULAR: Regular rate and rhythm with normal S1, S2. No S3, S4, murmurs or gallops. Capillary refill brisk, less than 3 seconds bilaterally. No unilateral lower extremity edema.    ABD/: Soft, nontender abdomen. Bowel sounds equal in all four quadrants. No tenderness, rebound, guarding or rigidity.    MSK: Spontaneously moves all 4 extremities without difficulty.  No injury or obvious deformity.  No midline neck or back pain or step-off deformities including cervical, thoracic or the lumbar spine.  Equal sensation of all 4 extremities.  No joint effusions, clubbing, cyanosis, or edema.    NEURO/PSYCH: A&Ox3. Cranial nerves II-XII grossly intact. No focal motor or sensory deficits. Appropriate mood and behavior.    ED Course & MDM   Diagnoses as of 11/20/23 1824   Right leg pain       Medical Decision Making  Parts of this chart have been completed using voice recognition software. Please excuse any errors of transcription. Despite the medical decision making time stamp above-my medical decision making has taken place during the patient's entire visit. My thought process and reason for plan has been formulated from the time that I saw the patient until the time of disposition and is not specific to one specific moment during their visit and furthermore my MDM encompasses this entire chart and not only this text box.      HPI: Detailed above.    Exam: A medically appropriate exam performed, outlined above, given the known history and presentation.    History obtained from: Patient    Social Determinants of Health considered during this visit: Age, coming from home    EKG not obtained for today's visit    Labs/Diagnostics: Lower extremity venous duplex of the right lower extremity    Medications given during visit: None    Differential diagnoses considered for this visit include: Muscle strain, muscle sprain, deep vein  thrombosis    Considerations/further MDM:    Patient is a 73-year-old male presenting for evaluation of right leg pain. Lower extremity venous duplex of the right lower extremity was ordered, interpreted by radiology, negative for presence of deep vein thrombosis.  Negative examination.  Results were discussed with the patient by my attending physician.  There were no acute findings on patient's work-up today warranting hospitalization or inpatient management.  Compartments were soft with distal pulses palpable.     All of the patient's questions were answered to the best of my ability. Patient states understanding that they have been screened for an emergency today, and we have not found any etiology of symptoms that requires emergent treatment or admission to the hospital at this point. They understand that they have not had definitive care day and require follow-up for treatment of their condition. They also state understanding that they may have an emergent condition that may potentially have not of detected at this visit and they must return to the emergency department if they develop any worsening of symptoms or new complaints.    Patient was seen in conjunction with attending physician Dr. Lev Hart.   Patient's history, physical exam, diagnostic studies, and treatment plan were discussed thoroughly.    Procedure  Procedures     Viki Pate PA-C  11/20/23 1936

## 2023-11-20 NOTE — ED TRIAGE NOTES
Patient has come to the ED via [facility] for right leg pain. Patient reports tenderness on palpation of the calf. Patient is alert and oriented x 4. Respirations are even and unlabored with symmetrical chest rise and fall observed.

## 2023-11-21 ENCOUNTER — OFFICE VISIT (OUTPATIENT)
Dept: PRIMARY CARE | Facility: CLINIC | Age: 73
End: 2023-11-21
Payer: MEDICARE

## 2023-11-21 ENCOUNTER — ANCILLARY PROCEDURE (OUTPATIENT)
Dept: RADIOLOGY | Facility: CLINIC | Age: 73
End: 2023-11-21
Payer: MEDICARE

## 2023-11-21 VITALS
HEIGHT: 69 IN | DIASTOLIC BLOOD PRESSURE: 71 MMHG | BODY MASS INDEX: 35.84 KG/M2 | WEIGHT: 242 LBS | TEMPERATURE: 98 F | HEART RATE: 52 BPM | SYSTOLIC BLOOD PRESSURE: 115 MMHG | OXYGEN SATURATION: 97 %

## 2023-11-21 DIAGNOSIS — M25.561 ACUTE PAIN OF RIGHT KNEE: Primary | ICD-10-CM

## 2023-11-21 DIAGNOSIS — M25.561 ACUTE PAIN OF RIGHT KNEE: ICD-10-CM

## 2023-11-21 DIAGNOSIS — M79.604 RIGHT LEG PAIN: ICD-10-CM

## 2023-11-21 PROCEDURE — 1159F MED LIST DOCD IN RCRD: CPT | Performed by: INTERNAL MEDICINE

## 2023-11-21 PROCEDURE — 1160F RVW MEDS BY RX/DR IN RCRD: CPT | Performed by: INTERNAL MEDICINE

## 2023-11-21 PROCEDURE — 1125F AMNT PAIN NOTED PAIN PRSNT: CPT | Performed by: INTERNAL MEDICINE

## 2023-11-21 PROCEDURE — 73562 X-RAY EXAM OF KNEE 3: CPT | Mod: RT,FY

## 2023-11-21 PROCEDURE — 99214 OFFICE O/P EST MOD 30 MIN: CPT | Performed by: INTERNAL MEDICINE

## 2023-11-21 PROCEDURE — 3074F SYST BP LT 130 MM HG: CPT | Performed by: INTERNAL MEDICINE

## 2023-11-21 PROCEDURE — 3078F DIAST BP <80 MM HG: CPT | Performed by: INTERNAL MEDICINE

## 2023-11-21 PROCEDURE — 1036F TOBACCO NON-USER: CPT | Performed by: INTERNAL MEDICINE

## 2023-11-21 RX ORDER — KETOCONAZOLE 20 MG/G
CREAM TOPICAL DAILY PRN
COMMUNITY

## 2023-11-21 ASSESSMENT — ENCOUNTER SYMPTOMS
LOSS OF SENSATION IN FEET: 0
DEPRESSION: 0
OCCASIONAL FEELINGS OF UNSTEADINESS: 0

## 2023-11-21 ASSESSMENT — PATIENT HEALTH QUESTIONNAIRE - PHQ9
1. LITTLE INTEREST OR PLEASURE IN DOING THINGS: NOT AT ALL
SUM OF ALL RESPONSES TO PHQ9 QUESTIONS 1 AND 2: 0
2. FEELING DOWN, DEPRESSED OR HOPELESS: NOT AT ALL

## 2023-11-21 ASSESSMENT — PAIN SCALES - GENERAL: PAINLEVEL: 3

## 2023-11-21 NOTE — DISCHARGE INSTRUCTIONS
Thank you for choosing [facility] and [facility]  for your emergency care.    Please return to the Emergency Department immediately if new or worsening symptoms occur. Symptoms of that are most concerning include intractable pain, chest pain or shortness of breath.    It is important to remember that your care does not end here and you must continue to monitor your condition closely. Please return to the emergency department for any worsening or concerning signs or symptoms as directed by our conversations and the discharge instructions. Otherwise please follow up with your doctor in 2 days if no better or worse. If you do not have a doctor please contact the referral number on your discharge instructions. Please contact any physician specialists provided in your discharge notes as it is very important to follow up with them regarding your condition. If you are unable to reach the physicians provided, please come back to the Emergency Department at any time.      As always, please take medications as directed. If you have any questions at all regarding your medications, please contact the pharmacist, the emergency department, or your doctor. Before taking any medication prescribed in the Emergency Department, please review the medication side effects and drug interactions as they may interact with your home medications.     Education materials have been provided to you about your encounter today.  Please review the attachments at your earliest convenience.

## 2023-11-21 NOTE — PROGRESS NOTES
[facility]: [facility]  PROGRESS NOTE      Anderson Barlow is a 73 y.o. male that is presenting today for ER fu (Maggy, ).    Assessment/Plan   Diagnoses and all orders for this visit:     Reviewed ED visit records  Right leg pain  Per HPI, exam and negative doppler ( ruling out DVT)most probably it's related to the knee.  Acute pain of right knee     -     XR knee right 3 views; Future   Meanwhile minimize ambulation / doing the exercise shown for the calf and thigh muscles while sitting and take tylenol as needed no more than 2000 mg daily.  Knee brace with ambulation . Heat or cold whichever gives you relief.  Subjective   - Patient --of [facility]-- is here --his daughter -- today for fu after ED visit yesterday for RT.leg pain -- noticed pain in the right leg few days ago that got worse so he went to the ED to b evaluated. The pain is located to the right calf and right thigh, orse with ambulation and improves to being none existent with rest.  Patient is on Eliquis for A.Fib and has  no history recent travel and no history of Trauma or immobility. Denies any CP or SOB ( Doppler study r/o DVT) and was d/ed to see his PCP.         -         - Patient denies any other symptoms or concerns at this time.         - patient denies any adverse reactions to or concerns with his/her meds.      Review of Systems   All pertinent POSITIVES as noted per HPI.  All other systems have been reviewed and are NEGATIVE and /or Noncontributory to this patient current visit or complaint.  Objective   Vitals:    11/21/23 0945   BP: 115/71   Pulse: 52   Temp: 36.7 °C (98 °F)   SpO2: 97%      Body mass index is 35.74 kg/m².  Physical Exam  Vitals and nursing note reviewed.   Constitutional:       Appearance: Normal appearance.   HENT:      Head: Normocephalic and atraumatic.   Neck:      Vascular: No carotid bruit.   Cardiovascular:      Rate and Rhythm: Normal rate and regular rhythm.      Pulses: Normal pulses.      " Heart sounds: Normal heart sounds.   Pulmonary:      Effort: Pulmonary effort is normal.      Breath sounds: Normal breath sounds.   Abdominal:      General: Abdomen is flat. Bowel sounds are normal.      Palpations: Abdomen is soft.   Musculoskeletal:         General: Swelling and tenderness (Rt. popliteal aspect) present. No deformity or signs of injury. Normal range of motion.      Cervical back: Neck supple.      Comments: Rt. Knee with posterior tenderness swelling and limited flexion with small effusion   Lymphadenopathy:      Cervical: No cervical adenopathy.   Skin:     General: Skin is warm and dry.   Neurological:      Mental Status: He is alert and oriented to person, place, and time.   Psychiatric:         Mood and Affect: Mood normal.     Diagnostic Results   Lab Results   Component Value Date    GLUCOSE 142 (H) 04/12/2023    CALCIUM 8.8 04/12/2023     04/12/2023    K 4.1 04/12/2023    CO2 23 (L) 04/12/2023     04/12/2023    BUN 18 04/12/2023    CREATININE 1.1 04/12/2023     Lab Results   Component Value Date    ALT 18 04/12/2023    AST 21 04/12/2023    ALKPHOS 98 04/12/2023    BILITOT 0.4 04/12/2023     Lab Results   Component Value Date    WBC 7.0 02/07/2023    HGB 14.6 02/07/2023    HCT 45.6 02/07/2023    MCV 93.6 02/07/2023     02/07/2023     Lab Results   Component Value Date    CHOL 158 03/03/2021    CHOL 163 01/06/2021     Lab Results   Component Value Date    HDL 37 (L) 03/03/2021    HDL 40 (L) 01/06/2021     Lab Results   Component Value Date    LDLCALC 97 03/03/2021    LDLCALC 99 01/06/2021     Lab Results   Component Value Date    TRIG 122 03/03/2021    TRIG 119 01/06/2021     No components found for: \"CHOLHDL\"  No results found for: \"HGBA1C\"  Other labs not included in the list above were reviewed either before or during this encounter.    History    History reviewed. No pertinent past medical history.  Past Surgical History:   Procedure Laterality Date    CATARACT " "EXTRACTION  06/23/2016    Cataract Surgery    COLONOSCOPY  06/23/2016    Complete Colonoscopy    CT ABDOMEN PELVIS ANGIOGRAM W AND/OR WO IV CONTRAST  11/4/2020    CT ABDOMEN PELVIS ANGIOGRAM W AND/OR WO IV CONTRAST [facility]    HEMORRHOID SURGERY  06/23/2016    Hemorrhoidectomy    OTHER SURGICAL HISTORY  03/28/2022    Abdominal aortic aneurysm repair     Family History   Problem Relation Name Age of Onset    Alzheimer's disease Mother      Liver cancer Other SIBLINGS      Social History     Socioeconomic History    Marital status:      Spouse name: Not on file    Number of children: Not on file    Years of education: Not on file    Highest education level: Not on file   Occupational History    Not on file   Tobacco Use    Smoking status: Former     Types: Cigarettes     Passive exposure: Past    Smokeless tobacco: Never    Tobacco comments:     Pt quit about 10 years ago   Vaping Use    Vaping Use: Never used   Substance and Sexual Activity    Alcohol use: Not Currently    Drug use: Never    Sexual activity: Not on file   Other Topics Concern    Not on file   Social History Narrative    Not on file     Social Determinants of Health     Financial Resource Strain: Not on file   Food Insecurity: Not on file   Transportation Needs: Not on file   Physical Activity: Not on file   Stress: Not on file   Social Connections: Not on file   Intimate Partner Violence: Not on file   Housing Stability: Not on file     Allergies   Allergen Reactions    Codeine Unknown    Hydrocodone Other     MENTAL STATUS CHANGE    Hydrocodone-Acetaminophen Other     AMS    Lisinopril Other and Unknown     CHOKING    Oxycodone-Acetaminophen Unknown    Succinylcholine Other and Unknown     \"CAUSED FATHER'S PROSTATE CANCER\"    Cephalexin Diarrhea and Palpitations     VOMITING  HEART RACING  FACE FLUSHING    Penicillins Rash     Current Outpatient Medications on File Prior to Visit   Medication Sig Dispense Refill    acetaminophen " (Tylenol Extra Strength) 500 mg tablet Take 1 tablet (500 mg) by mouth every 4 hours if needed.      amiodarone (Pacerone) 200 mg tablet 1 tablet once/day every Tuesday, Thursday, Saturday, and Sunday. (Patient taking differently: 1 tablet once/day every Tuesday, Thursday, Saturday, and Sunday.; 1/2 tab mon, wed, fri)      apixaban (Eliquis) 5 mg tablet Take 1 tablet (5 mg) by mouth 2 times a day.      aspirin-acetaminophen-caffeine (Excedrin Extra Strength) 250-250-65 mg tablet Take 2 tablets by mouth once daily.      baclofen (Lioresal) 20 mg tablet Take 0.5 tablets (10 mg) by mouth 3 times a day as needed for muscle spasms for up to 5 days. 15 tablet 0    cholecalciferol (Vitamin D3) 25 MCG (1000 UT) capsule Take 1 capsule (25 mcg) by mouth once daily.      ketoconazole (NIZOral) 2 % cream Apply topically once daily.      losartan (Cozaar) 50 mg tablet Take 1 tablet (50 mg) by mouth once daily.      metoprolol tartrate (Lopressor) 50 mg tablet Take 1 tablet by mouth 3 times a day.      oxyCODONE-acetaminophen (Percocet) 5-325 mg tablet Take 1 tablet by mouth every 6 hours if needed for severe pain (7 - 10) for up to 2 days. 6 tablet 0    pravastatin (Pravachol) 40 mg tablet Take 1 tablet (40 mg) by mouth once daily at bedtime.      triamcinolone (Kenalog) 0.1 % cream APPLY TWICE DAILY TO RASH UP TO 2 WEEKS/MONTH AS NEEDED.      [DISCONTINUED] sildenafil (Viagra) 100 mg tablet Take 1 tablet (100 mg) by mouth once daily as needed.       Current Facility-Administered Medications on File Prior to Visit   Medication Dose Route Frequency Provider Last Rate Last Admin    [COMPLETED] oxyCODONE-acetaminophen (Percocet) 5-325 mg per tablet 1 tablet  1 tablet oral Once Lev Hart, DO   1 tablet at 11/20/23 1754     Immunization History   Administered Date(s) Administered    Flu vaccine, quadrivalent, high-dose, preservative free, age 65y+ (FLUZONE) 10/22/2021, 10/27/2023    Influenza, High Dose Seasonal,  Preservative Free 10/19/2016, 02/09/2017, 11/05/2019    Influenza, Seasonal, Quadrivalent, Adjuvanted 10/13/2020    Influenza, seasonal, injectable 11/24/2007, 09/03/2009, 11/11/2010, 10/31/2011, 09/25/2012, 10/23/2013, 11/12/2014, 11/12/2015, 10/24/2016    Influenza, trivalent, adjuvanted 11/20/2017, 10/29/2018    Pfizer Purple Cap SARS-CoV-2 03/05/2021, 04/02/2021, 01/01/2022, 01/20/2022    Pneumococcal conjugate vaccine, 13-valent (PREVNAR 13) 08/17/2015    Pneumococcal polysaccharide vaccine, 23-valent, age 2 years and older (PNEUMOVAX 23) 10/23/2013    Td vaccine, age 7 years and older (TDVAX) 12/30/1994    Tdap vaccine, age 7 year and older (BOOSTRIX) 09/06/2012, 01/01/2014, 11/29/2017    Zoster, live 09/03/2009     Patient's medical history was reviewed and updated either before or during this encounter.       Abilio Daigle MD

## 2023-11-29 DIAGNOSIS — E78.2 MIXED HYPERLIPIDEMIA: Primary | ICD-10-CM

## 2023-11-29 RX ORDER — PRAVASTATIN SODIUM 40 MG/1
40 TABLET ORAL NIGHTLY
Qty: 90 TABLET | Refills: 3 | Status: SHIPPED | OUTPATIENT
Start: 2023-11-29 | End: 2024-05-03 | Stop reason: SDUPTHER

## 2023-12-21 ENCOUNTER — OFFICE VISIT (OUTPATIENT)
Dept: CARDIOLOGY | Facility: CLINIC | Age: 73
End: 2023-12-21
Payer: MEDICARE

## 2023-12-21 VITALS
DIASTOLIC BLOOD PRESSURE: 78 MMHG | WEIGHT: 256 LBS | HEART RATE: 57 BPM | BODY MASS INDEX: 37.8 KG/M2 | OXYGEN SATURATION: 97 % | SYSTOLIC BLOOD PRESSURE: 140 MMHG

## 2023-12-21 DIAGNOSIS — I10 ESSENTIAL HYPERTENSION: ICD-10-CM

## 2023-12-21 DIAGNOSIS — I48.0 PAROXYSMAL ATRIAL FIBRILLATION (MULTI): Primary | ICD-10-CM

## 2023-12-21 DIAGNOSIS — I71.40 ABDOMINAL AORTIC ANEURYSM (AAA) WITHOUT RUPTURE, UNSPECIFIED PART (CMS-HCC): ICD-10-CM

## 2023-12-21 DIAGNOSIS — I35.0 AORTIC STENOSIS, MILD: ICD-10-CM

## 2023-12-21 PROCEDURE — 1160F RVW MEDS BY RX/DR IN RCRD: CPT | Performed by: INTERNAL MEDICINE

## 2023-12-21 PROCEDURE — 1159F MED LIST DOCD IN RCRD: CPT | Performed by: INTERNAL MEDICINE

## 2023-12-21 PROCEDURE — 99214 OFFICE O/P EST MOD 30 MIN: CPT | Performed by: INTERNAL MEDICINE

## 2023-12-21 PROCEDURE — 1036F TOBACCO NON-USER: CPT | Performed by: INTERNAL MEDICINE

## 2023-12-21 PROCEDURE — 3078F DIAST BP <80 MM HG: CPT | Performed by: INTERNAL MEDICINE

## 2023-12-21 PROCEDURE — 3077F SYST BP >= 140 MM HG: CPT | Performed by: INTERNAL MEDICINE

## 2023-12-21 PROCEDURE — 1125F AMNT PAIN NOTED PAIN PRSNT: CPT | Performed by: INTERNAL MEDICINE

## 2023-12-21 ASSESSMENT — ENCOUNTER SYMPTOMS
COUGH: 0
PND: 0
PALPITATIONS: 0
WEIGHT LOSS: 0
MYALGIAS: 0
CLAUDICATION: 0
FEVER: 0
ORTHOPNEA: 0
OCCASIONAL FEELINGS OF UNSTEADINESS: 0
DEPRESSION: 0
WEAKNESS: 0
SHORTNESS OF BREATH: 0
NEAR-SYNCOPE: 0
DIZZINESS: 0
SYNCOPE: 0
WHEEZING: 0
IRREGULAR HEARTBEAT: 0
DYSPNEA ON EXERTION: 0
LOSS OF SENSATION IN FEET: 0
WEIGHT GAIN: 0
DIAPHORESIS: 0

## 2023-12-21 ASSESSMENT — PATIENT HEALTH QUESTIONNAIRE - PHQ9
SUM OF ALL RESPONSES TO PHQ9 QUESTIONS 1 & 2: 0
2. FEELING DOWN, DEPRESSED OR HOPELESS: NOT AT ALL
1. LITTLE INTEREST OR PLEASURE IN DOING THINGS: NOT AT ALL

## 2023-12-21 ASSESSMENT — PAIN SCALES - GENERAL: PAINLEVEL: 2

## 2023-12-21 NOTE — PROGRESS NOTES
Subjective      Chief Complaint   Patient presents with    Follow-up     Follow up for Afib and to discuss eliquis        73-year-old male with history of paroxysmal atrial fibrillation on Rythmol, beta-blocker, and oral anticoagulation.  He has an abdominal aortic aneurysm that is monitored by [facility].  He is here for 6-month follow-up. He takes Eliquis 1x/day 2/2 frequent nosebleeds.          Review of Systems   Constitutional: Negative for diaphoresis, fever, weight gain and weight loss.   Eyes:  Negative for visual disturbance.   Cardiovascular:  Negative for chest pain, claudication, dyspnea on exertion, irregular heartbeat, leg swelling, near-syncope, orthopnea, palpitations, paroxysmal nocturnal dyspnea and syncope.   Respiratory:  Negative for cough, shortness of breath and wheezing.    Musculoskeletal:  Negative for muscle weakness and myalgias.   Neurological:  Negative for dizziness and weakness.   All other systems reviewed and are negative.       Past Medical History:   Diagnosis Date    Aneurysm (CMS/HCC)     Atrial fibrillation (CMS/HCC)     Clotting disorder (CMS/HCC)     Hypertension         Past Surgical History:   Procedure Laterality Date    ARTERIAL ANEURYSM REPAIR      CATARACT EXTRACTION  06/23/2016    Cataract Surgery    COLONOSCOPY  06/23/2016    Complete Colonoscopy    CT ABDOMEN PELVIS ANGIOGRAM W AND/OR WO IV CONTRAST  11/04/2020    CT ABDOMEN PELVIS ANGIOGRAM W AND/OR WO IV CONTRAST [facility]    HEMORRHOID SURGERY  06/23/2016    Hemorrhoidectomy    OTHER SURGICAL HISTORY  03/28/2022    Abdominal aortic aneurysm repair        Social History     Socioeconomic History    Marital status:      Spouse name: Not on file    Number of children: Not on file    Years of education: Not on file    Highest education level: Not on file   Occupational History    Not on file   Tobacco Use    Smoking status: Former     Types: Cigarettes     Passive exposure:  Past    Smokeless tobacco: Never    Tobacco comments:     Pt quit about 10 years ago   Vaping Use    Vaping Use: Never used   Substance and Sexual Activity    Alcohol use: Not Currently    Drug use: Never    Sexual activity: Defer     Partners: Female   Other Topics Concern    Not on file   Social History Narrative    Not on file     Social Determinants of Health     Financial Resource Strain: Not on file   Food Insecurity: Not on file   Transportation Needs: Not on file   Physical Activity: Not on file   Stress: Not on file   Social Connections: Not on file   Intimate Partner Violence: Not on file   Housing Stability: Not on file        Family History   Problem Relation Name Age of Onset    Alzheimer's disease Mother      Liver cancer Other SIBLINGS         OBJECTIVE:    Vitals:    12/21/23 1450   BP: 140/78   Pulse: 57   SpO2: 97%        Vitals reviewed.   Constitutional:       Appearance: Normal and healthy appearance. Not in distress.   Pulmonary:      Effort: Pulmonary effort is normal.      Breath sounds: Normal breath sounds.   Cardiovascular:      Normal rate. Regular rhythm. Normal S1. Normal S2.       Murmurs: There is a grade 2/6 midsystolic murmur at the URSB.      No gallop.  No click.   Pulses:     Intact distal pulses.   Edema:     Peripheral edema absent.   Skin:     General: Skin is warm and dry.   Neurological:      General: No focal deficit present.          Lab Review:   Lab Results   Component Value Date     04/12/2023    K 4.1 04/12/2023     04/12/2023    CO2 23 (L) 04/12/2023    BUN 18 04/12/2023    CREATININE 1.1 04/12/2023    GLUCOSE 142 (H) 04/12/2023    CALCIUM 8.8 04/12/2023     Lab Results   Component Value Date    CHOL 158 03/03/2021    TRIG 122 03/03/2021    HDL 37 (L) 03/03/2021       Lab Results   Component Value Date    LDLCALC 97 03/03/2021        Abdominal aortic aneurysm (AAA) without rupture (CMS/HCC)  S/p surgical repair    Aortic stenosis, mild  Mild progressive  stage B on Echo 7/2023. Observe    Essential hypertension  controlled    Paroxysmal atrial fibrillation (CMS/HCC)  Will have him evaluated for WATCHMAN given his frequent Epistaxis on AC

## 2024-02-07 ENCOUNTER — APPOINTMENT (OUTPATIENT)
Dept: CARDIOLOGY | Facility: CLINIC | Age: 74
End: 2024-02-07
Payer: MEDICARE

## 2024-02-27 DIAGNOSIS — I10 ESSENTIAL HYPERTENSION: ICD-10-CM

## 2024-02-28 RX ORDER — LOSARTAN POTASSIUM 50 MG/1
50 TABLET ORAL DAILY
Qty: 90 TABLET | Refills: 3 | Status: SHIPPED | OUTPATIENT
Start: 2024-02-28 | End: 2024-05-03 | Stop reason: SDUPTHER

## 2024-03-19 NOTE — PROGRESS NOTES
PCP: Dr. Vu  Cardio: Dr. Vides     I was asked by Dr. Vides to evaluate this patient in consultation for evaluation of left atrial appendage closure.   The patient is a 73 year old male with PMH of HTN, AAA, Afib on eliquis. He has had multiple episodes of epistaxis while on eliquis, so he cut it down to once a day only.  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   Patient also has history of falls, one of the falls few months ago caused him to visit ED for ankle injury requiring brace    The patient has normal LVEF.    Given the patient's significant histoe,  the patient is referred for consideration of left atrial appendage closure for stroke risk reduction.       ROS:  Constitutional: not feeling tired, not feeling poorly, no fever and no chills.   Eyes: no eyesight problems, no blurred vision, no diplopia, no eye pain, no purulent discharge from the eyes, eyes not red, no dryness of the eyes and no itching of the eyes.   ENT: + nosebleeds, no hearing loss, no tinnitus, no earache, no sore throat, no hoarseness, no swollen glands in the neck and no nasal discharge.   Cardiovascular: no intermittent leg claudication, no chest pain, no tightness or heavy pressure, no shortness of breath, no palpitations, no lower extremity edema, the heart rate was not slow, the heart rate was not fast and as noted in HPI.   Respiratory: no chronic cough, not coughing up sputum,  no wheezing that is consistent with asthma, no asthma, no orthopnea and no postural nocturnal dyspnea.   Gastrointestinal: no change in bowel habits, no blood in stools, no diarrhea, no constipation, no nausea, no vomiting, no abdominal pain, no signs and symptoms of ulcer disease, no christina colored stools and no intolerance to fatty foods.   Musculoskeletal: no arthralgias, no myalgias, no joint swelling, no joint stiffness, no muscle weakness, no back pain, no limb pain, no limb swelling and no  difficulty walking.   Skin: no skin rashes, no change in skin color and pigmentation, no skin lesions and no skin lumps.   Neurological: no seizures, no frequent falls, no headaches, no dizziness, no tingling, no fainting and no limb weakness.   Psychiatric: no depression, not suicidal, no confusion, no memory lapses or loss, no anxiety, no personality change and no emotional problems.   All other systems have been reviewed and are negative for complaint.     Physical Exam:     Visit Vitals  Smoking Status Former        Constitutional: alert and in no acute distress.   Eyes: no erythema, swelling or discharge from the eye .   Ears, Nose, Mouth, and Throat: external inspection of ears and nose is normal , lips, teeth, and gums are normal with good dentition  and oropharynx normal with no erythema, edema, exudate or lesions .   Neck: neck is supple, symmetric, trachea midline, no masses  and no thyromegaly .   Pulmonary: no increased work of breathing or signs of respiratory distress , lungs clear to auscultation. , normal percussion of chest  and chest palpation normal .   Cardiovascular: RRR, no murmur,  no leg edema, non-displaced PMI, no S3 or S4  Abdomen: abdomen non-tender, no masses  and no hepatomegaly .           Skin:  no skin lesions          Neurologic: non-focal neurologic examination.      Psychiatric judgment and insight is normal , oriented to person, place and time , normal mood and affect .       Labs:    Results for orders placed or performed in visit on 04/12/23   Comprehensive Metabolic Panel   Result Value Ref Range    Calcium 8.8 8.5 - 10.4 MG/DL    AST 21 5 - 40 U/L    Alkaline Phosphatase 98 35 - 125 U/L    Total Bilirubin 0.4 0.1 - 1.2 MG/DL    Total Protein 6.4 5.9 - 7.9 G/DL    Albumin 3.8 3.5 - 5.0 GM/DL    Globulin, Total 2.6 1.9 - 3.7 G/DL    A/G Ratio 1.5 1.5 - 3.0 RATIO    Sodium 138 133 - 145 MMOL/L    Potassium 4.1 3.4 - 5.1 MMOL/L    Chloride 104 97 - 107 MMOL/L    Bicarbonate 23  (L) 24 - 31 MMOL/L    Anion Gap 11 0 - 19 MMOL/L    Urea Nitrogen 18 8 - 25 MG/DL    Creatinine 1.1 0.4 - 1.6 MG/DL    Urea Nitrogen/Creatinine (g/g) Urine 16.4 8 - 21 RATIO    Glucose 142 (H) 65 - 99 MG/DL    ALT (SGPT) 18 5 - 40 U/L    ESTIMATED GFR 70 mL/min/1.73 m2   Thyroxine, Free   Result Value Ref Range    Free T4 1.5 0.9 - 1.7 NG/DL   TSH with reflex to Free T4 if abnormal   Result Value Ref Range    Thyroid Stimulating Hormone 5.67 (H) 0.27 - 4.20 MIU/L          Medications:    Current Outpatient Medications   Medication Instructions    acetaminophen (TYLENOL EXTRA STRENGTH) 500 mg, oral, Every 4 hours PRN    amiodarone (Pacerone) 200 mg tablet 1 tablet once/day every Tuesday, Thursday, Saturday, and Sunday.    apixaban (ELIQUIS) 5 mg, oral, 2 times daily    aspirin-acetaminophen-caffeine (Excedrin Extra Strength) 250-250-65 mg tablet 2 tablets, oral, Daily    baclofen (LIORESAL) 10 mg, oral, 3 times daily PRN    cholecalciferol (VITAMIN D3) 25 mcg, oral, Daily    ketoconazole (NIZOral) 2 % cream Topical, Daily    losartan (COZAAR) 50 mg, oral, Daily    metoprolol tartrate (LOPRESSOR) 50 mg, oral, 3 times daily    pravastatin (PRAVACHOL) 40 mg, oral, Nightly    triamcinolone (Kenalog) 0.1 % cream APPLY TWICE DAILY TO RASH UP TO 2 WEEKS/MONTH AS NEEDED.          Assessment:      This is a 73-year-old male with past medical history of hypertension, A-fib, who is noncompliant with proper dosing of Eliquis due to episodes of multiple epistaxis and bruising.  He also has history of thrombocytopenia and was being worked up by hematology at [facility].  Patient also has history of falls causing bodily injury requiring treatment in ER.     The CHADS-VASC score is 3 and HAS-BLED score is 3. The patient is at increased risk of both bleeding and stroke.  As such the patient is a reasonable candidate for consideration of left atrial appendage occluder placement.    Today we discussed the left atrial appendage  closure procedure. The patient was given written educational handout materials and watched an educational video. All risks, benefits and alternative were discussed.     The risks discussed included but were not limited to vascular complications, sedation related complications, risk of MI, CVA, device embolization, pericardial tamponade and death. The patient verbalized understanding and decided to proceed.        The patient will need a CT scan/HARPREET 4 months after the procedure, to evaluate the device for position, thrombus and carmelita-device leak.. Following device implant, strategy will be for dual antiplatelet therapy with aspirin and clopidogrel for 6 months then aspirin for life.     Thank you, Dr. Vides, for this consultation and for allowing me to participate in the care of this patient.

## 2024-03-20 ENCOUNTER — OFFICE VISIT (OUTPATIENT)
Dept: CARDIOLOGY | Facility: CLINIC | Age: 74
End: 2024-03-20
Payer: MEDICARE

## 2024-03-20 VITALS
SYSTOLIC BLOOD PRESSURE: 159 MMHG | OXYGEN SATURATION: 98 % | HEART RATE: 54 BPM | HEIGHT: 70 IN | DIASTOLIC BLOOD PRESSURE: 91 MMHG | WEIGHT: 254.8 LBS | BODY MASS INDEX: 36.48 KG/M2

## 2024-03-20 DIAGNOSIS — I48.0 PAROXYSMAL ATRIAL FIBRILLATION (MULTI): Primary | ICD-10-CM

## 2024-03-20 PROCEDURE — 99214 OFFICE O/P EST MOD 30 MIN: CPT | Performed by: INTERNAL MEDICINE

## 2024-03-20 PROCEDURE — 1157F ADVNC CARE PLAN IN RCRD: CPT | Performed by: INTERNAL MEDICINE

## 2024-03-20 PROCEDURE — 1159F MED LIST DOCD IN RCRD: CPT | Performed by: INTERNAL MEDICINE

## 2024-03-20 PROCEDURE — 1036F TOBACCO NON-USER: CPT | Performed by: INTERNAL MEDICINE

## 2024-03-20 PROCEDURE — 3077F SYST BP >= 140 MM HG: CPT | Performed by: INTERNAL MEDICINE

## 2024-03-20 PROCEDURE — 1126F AMNT PAIN NOTED NONE PRSNT: CPT | Performed by: INTERNAL MEDICINE

## 2024-03-20 PROCEDURE — 3079F DIAST BP 80-89 MM HG: CPT | Performed by: INTERNAL MEDICINE

## 2024-03-20 ASSESSMENT — PATIENT HEALTH QUESTIONNAIRE - PHQ9
SUM OF ALL RESPONSES TO PHQ9 QUESTIONS 1 AND 2: 0
1. LITTLE INTEREST OR PLEASURE IN DOING THINGS: NOT AT ALL
2. FEELING DOWN, DEPRESSED OR HOPELESS: NOT AT ALL

## 2024-03-20 ASSESSMENT — COLUMBIA-SUICIDE SEVERITY RATING SCALE - C-SSRS
6. HAVE YOU EVER DONE ANYTHING, STARTED TO DO ANYTHING, OR PREPARED TO DO ANYTHING TO END YOUR LIFE?: NO
2. HAVE YOU ACTUALLY HAD ANY THOUGHTS OF KILLING YOURSELF?: NO
1. IN THE PAST MONTH, HAVE YOU WISHED YOU WERE DEAD OR WISHED YOU COULD GO TO SLEEP AND NOT WAKE UP?: NO

## 2024-03-20 ASSESSMENT — PAIN SCALES - GENERAL: PAINLEVEL: 0-NO PAIN

## 2024-03-26 ENCOUNTER — TELEPHONE (OUTPATIENT)
Dept: CARDIOLOGY | Facility: HOSPITAL | Age: 74
End: 2024-03-26
Payer: MEDICARE

## 2024-04-09 ENCOUNTER — LAB (OUTPATIENT)
Dept: LAB | Facility: LAB | Age: 74
End: 2024-04-09
Payer: MEDICARE

## 2024-04-09 ENCOUNTER — OFFICE VISIT (OUTPATIENT)
Dept: CARDIOLOGY | Facility: CLINIC | Age: 74
End: 2024-04-09
Payer: MEDICARE

## 2024-04-09 VITALS
OXYGEN SATURATION: 94 % | DIASTOLIC BLOOD PRESSURE: 84 MMHG | HEIGHT: 70 IN | BODY MASS INDEX: 35.79 KG/M2 | WEIGHT: 250 LBS | HEART RATE: 54 BPM | SYSTOLIC BLOOD PRESSURE: 139 MMHG

## 2024-04-09 DIAGNOSIS — Z12.5 ENCOUNTER FOR PROSTATE CANCER SCREENING: ICD-10-CM

## 2024-04-09 DIAGNOSIS — I48.0 PAROXYSMAL ATRIAL FIBRILLATION (MULTI): ICD-10-CM

## 2024-04-09 DIAGNOSIS — N40.0 BENIGN PROSTATIC HYPERPLASIA WITHOUT URINARY OBSTRUCTION: ICD-10-CM

## 2024-04-09 DIAGNOSIS — D69.6 THROMBOCYTOPENIA (CMS-HCC): ICD-10-CM

## 2024-04-09 DIAGNOSIS — R73.9 HYPERGLYCEMIA: ICD-10-CM

## 2024-04-09 DIAGNOSIS — E55.9 VITAMIN D DEFICIENCY: ICD-10-CM

## 2024-04-09 DIAGNOSIS — E78.2 MIXED HYPERLIPIDEMIA: ICD-10-CM

## 2024-04-09 DIAGNOSIS — Z01.89 ENCOUNTER FOR ROUTINE LABORATORY TESTING: ICD-10-CM

## 2024-04-09 DIAGNOSIS — E03.8 SUBCLINICAL HYPOTHYROIDISM: ICD-10-CM

## 2024-04-09 LAB
25(OH)D3 SERPL-MCNC: 14 NG/ML (ref 31–100)
ALBUMIN SERPL-MCNC: 4.4 G/DL (ref 3.5–5)
ALP BLD-CCNC: 115 U/L (ref 35–125)
ALT SERPL-CCNC: 17 U/L (ref 5–40)
ANION GAP SERPL CALC-SCNC: 15 MMOL/L
AST SERPL-CCNC: 20 U/L (ref 5–40)
BILIRUB DIRECT SERPL-MCNC: <0.2 MG/DL (ref 0–0.2)
BILIRUB SERPL-MCNC: 0.6 MG/DL (ref 0.1–1.2)
BUN SERPL-MCNC: 14 MG/DL (ref 8–25)
CALCIUM SERPL-MCNC: 9.3 MG/DL (ref 8.5–10.4)
CHLORIDE SERPL-SCNC: 100 MMOL/L (ref 97–107)
CHOLEST SERPL-MCNC: 201 MG/DL (ref 133–200)
CHOLEST/HDLC SERPL: 4.9 {RATIO}
CO2 SERPL-SCNC: 26 MMOL/L (ref 24–31)
CREAT SERPL-MCNC: 1.3 MG/DL (ref 0.4–1.6)
EGFRCR SERPLBLD CKD-EPI 2021: 57 ML/MIN/1.73M*2
ERYTHROCYTE [DISTWIDTH] IN BLOOD BY AUTOMATED COUNT: 13.4 % (ref 11.5–14.5)
EST. AVERAGE GLUCOSE BLD GHB EST-MCNC: 114 MG/DL
GLUCOSE SERPL-MCNC: 95 MG/DL (ref 65–99)
HBA1C MFR BLD: 5.6 %
HCT VFR BLD AUTO: 46.6 % (ref 41–52)
HDLC SERPL-MCNC: 41 MG/DL
HGB BLD-MCNC: 14.9 G/DL (ref 13.5–17.5)
LDLC SERPL CALC-MCNC: 133 MG/DL (ref 65–130)
MCH RBC QN AUTO: 30.2 PG (ref 26–34)
MCHC RBC AUTO-ENTMCNC: 32 G/DL (ref 32–36)
MCV RBC AUTO: 95 FL (ref 80–100)
NRBC BLD-RTO: 0 /100 WBCS (ref 0–0)
PLATELET # BLD AUTO: 71 X10*3/UL (ref 150–450)
POTASSIUM SERPL-SCNC: 4.6 MMOL/L (ref 3.4–5.1)
PROT SERPL-MCNC: 6.7 G/DL (ref 5.9–7.9)
PSA SERPL-MCNC: 3.7 NG/ML
RBC # BLD AUTO: 4.93 X10*6/UL (ref 4.5–5.9)
SODIUM SERPL-SCNC: 141 MMOL/L (ref 133–145)
T4 FREE SERPL-MCNC: 1.4 NG/DL (ref 0.9–1.7)
TRIGL SERPL-MCNC: 134 MG/DL (ref 40–150)
TSH SERPL DL<=0.05 MIU/L-ACNC: 6.31 MIU/L (ref 0.27–4.2)
WBC # BLD AUTO: 6 X10*3/UL (ref 4.4–11.3)

## 2024-04-09 PROCEDURE — 84439 ASSAY OF FREE THYROXINE: CPT

## 2024-04-09 PROCEDURE — 1157F ADVNC CARE PLAN IN RCRD: CPT | Performed by: INTERNAL MEDICINE

## 2024-04-09 PROCEDURE — G0103 PSA SCREENING: HCPCS

## 2024-04-09 PROCEDURE — 85027 COMPLETE CBC AUTOMATED: CPT

## 2024-04-09 PROCEDURE — 99214 OFFICE O/P EST MOD 30 MIN: CPT | Performed by: INTERNAL MEDICINE

## 2024-04-09 PROCEDURE — 93005 ELECTROCARDIOGRAM TRACING: CPT | Performed by: INTERNAL MEDICINE

## 2024-04-09 PROCEDURE — 83036 HEMOGLOBIN GLYCOSYLATED A1C: CPT

## 2024-04-09 PROCEDURE — 3075F SYST BP GE 130 - 139MM HG: CPT | Performed by: INTERNAL MEDICINE

## 2024-04-09 PROCEDURE — 1159F MED LIST DOCD IN RCRD: CPT | Performed by: INTERNAL MEDICINE

## 2024-04-09 PROCEDURE — 80061 LIPID PANEL: CPT

## 2024-04-09 PROCEDURE — 80053 COMPREHEN METABOLIC PANEL: CPT

## 2024-04-09 PROCEDURE — 82248 BILIRUBIN DIRECT: CPT

## 2024-04-09 PROCEDURE — 3079F DIAST BP 80-89 MM HG: CPT | Performed by: INTERNAL MEDICINE

## 2024-04-09 PROCEDURE — 36415 COLL VENOUS BLD VENIPUNCTURE: CPT

## 2024-04-09 PROCEDURE — 93010 ELECTROCARDIOGRAM REPORT: CPT | Performed by: INTERNAL MEDICINE

## 2024-04-09 PROCEDURE — 84443 ASSAY THYROID STIM HORMONE: CPT

## 2024-04-09 PROCEDURE — 82306 VITAMIN D 25 HYDROXY: CPT

## 2024-04-09 ASSESSMENT — LIFESTYLE VARIABLES
HOW OFTEN DO YOU HAVE A DRINK CONTAINING ALCOHOL: NEVER
HOW MANY STANDARD DRINKS CONTAINING ALCOHOL DO YOU HAVE ON A TYPICAL DAY: PATIENT DOES NOT DRINK
AUDIT-C TOTAL SCORE: 0
HOW OFTEN DO YOU HAVE SIX OR MORE DRINKS ON ONE OCCASION: NEVER
SKIP TO QUESTIONS 9-10: 1

## 2024-04-09 ASSESSMENT — ENCOUNTER SYMPTOMS
DEPRESSION: 0
OCCASIONAL FEELINGS OF UNSTEADINESS: 0
LOSS OF SENSATION IN FEET: 0

## 2024-04-09 ASSESSMENT — CHA2DS2 SCORE
SEX: MALE
CHA2D2S VASC SCORE: 3
PRIOR STROKE OR TIA OR THROMBOEMBOLISM: NO
VASCULAR DISEASE: NO
DIABETES: NO
AGE IN YEARS: 75+
HYPERTENSION: YES
CHF OR LEFT VENTRICULAR DYSFUNCTION: NO

## 2024-04-09 NOTE — PATIENT INSTRUCTIONS
It was nice to see you today!    You will have your blood drawn today.  You should resume your normal dose of eliquis 5 mg twice a day.  If all blood work looks good - we will recommend AF ablation and then hopefully be able to get off of the amiodarone.  For your long term stroke risk reduction, we discussed that you may be candidate for a left atrial clip via thorascopic or mini thoracotomy procedure.  This would permit you to be off of antiplatelet and anticoagulant long term.

## 2024-04-09 NOTE — PROGRESS NOTES
Returns for follow up visit for atrial fibrillation management        History Of Present Illness:    Anderson Barlow is a 73 y.o. year old male patient     72 yo with diagnosis of atrial fibrillation. He presented with a fever of 102 Degrees and cellulitis. Incidentally he was found to be in atrial fibrillation with RVR. Oct 31, 2017. He initially converted with diltiazem but then this was recurred. He was initiated on propafenone 150 mg three time a day. This worked well for some time.      In December 2020 he had a gift of a heart monitor for [employer]. He then noted recurrent episodes of rapid heart rates. He went to [facility] and they gave him some additional Diltiazem with DC conversion. He put his propafenone up to 3 x a day. He has cut the caffeine down but is still ingesting 1 cups a day. He then was undergoing AAA resection. 7/28/21. He was started on amiodarone 400 m g daily - his last recurrence early September.   In November 2021 we decreased his maintenance amiodarone to 200 mg dailly  He decreased the amiodarone 100 mg M-W _F and 200 mg rest of the days. He has had no recurrence.  His last TSH  was 5.67 and T4 nml 4/2023    Usually his AF is paroxysmal but he did have one cardioversion he recalls.  He reports significant offset pauses after conversion.    He also has had difficulty with thrombocytopenia and is reluctant to take any antiplatelet agent.         Past Medical History:  Past Medical History:   Diagnosis Date    Aneurysm (CMS/HCC)     Atrial fibrillation (CMS/HCC)     Clotting disorder (CMS/HCC)     Hypertension        Past Surgical History:  Past Surgical History:   Procedure Laterality Date    ARTERIAL ANEURYSM REPAIR      CATARACT EXTRACTION  06/23/2016    Cataract Surgery    COLONOSCOPY  06/23/2016    Complete Colonoscopy    CT ABDOMEN PELVIS ANGIOGRAM W AND/OR WO IV CONTRAST  11/04/2020    CT ABDOMEN PELVIS ANGIOGRAM W AND/OR WO IV CONTRAST [facility]    HEMORRHOID SURGERY   "06/23/2016    Hemorrhoidectomy    OTHER SURGICAL HISTORY  03/28/2022    Abdominal aortic aneurysm repair          Social History:  Caffeine:   Cigarettes:  ETOH:  Drugs: None  Exercise:  Occ: retired  Marital status:  he is caring for his wife who has Alzheimer's     Family History:  Family History   Problem Relation Name Age of Onset    Alzheimer's disease Mother      Liver cancer Other SIBLINGS          Allergies:  Allergies   Allergen Reactions    Codeine Unknown    Hydrocodone Other     MENTAL STATUS CHANGE    Hydrocodone-Acetaminophen Other     AMS    Lisinopril Other and Unknown     CHOKING    Oxycodone-Acetaminophen Unknown    Succinylcholine Other and Unknown     \"CAUSED FATHER'S PROSTATE CANCER\"    Cephalexin Diarrhea and Palpitations     VOMITING  HEART RACING  FACE FLUSHING    Penicillins Rash        Outpatient Medications:  Current Outpatient Medications   Medication Instructions    acetaminophen (TYLENOL EXTRA STRENGTH) 500 mg, oral, Every 4 hours PRN    amiodarone (Pacerone) 200 mg tablet 1 tablet once/day every Tuesday, Thursday, Saturday, and Sunday.    apixaban (ELIQUIS) 5 mg, oral, 2 times daily    aspirin-acetaminophen-caffeine (Excedrin Extra Strength) 250-250-65 mg tablet 2 tablets, oral, Daily    baclofen (LIORESAL) 10 mg, oral, 3 times daily PRN    cholecalciferol (VITAMIN D3) 25 mcg, oral, Daily    ketoconazole (NIZOral) 2 % cream Topical, Daily    losartan (COZAAR) 50 mg, oral, Daily    metoprolol tartrate (LOPRESSOR) 50 mg, oral, 3 times daily    pravastatin (PRAVACHOL) 40 mg, oral, Nightly    triamcinolone (Kenalog) 0.1 % cream APPLY TWICE DAILY TO RASH UP TO 2 WEEKS/MONTH AS NEEDED.         Last Recorded Vitals:      11/20/2023     5:22 PM 11/20/2023     6:48 PM 11/20/2023     7:14 PM 11/21/2023     9:45 AM 12/21/2023     2:50 PM 3/20/2024     3:33 PM 3/20/2024     3:34 PM   Vitals   Systolic 170 142 147 115 140 161 159   Diastolic 71 62 75 71 78 84 91   Heart Rate 54 54 57 52 57 54  " "  Temp 37 °C (98.6 °F)   36.7 °C (98 °F)      Resp 16 16 17       Height (in) 1.753 m (5' 9\")   1.753 m (5' 9\")  1.778 m (5' 10\")    Weight (lb) 250.66   242 256 254.8    BMI 37.02 kg/m2   35.74 kg/m2 37.8 kg/m2 36.56 kg/m2    BSA (m2) 2.36 m2   2.31 m2 2.38 m2 2.39 m2    Visit Report    Report Report Report Report        Physical Exam:  Physical Exam  Constitutional:       Appearance: Normal appearance. He is obese.   Neck:      Vascular: No carotid bruit.   Cardiovascular:      Rate and Rhythm: Normal rate and regular rhythm.      Chest Wall: PMI is not displaced.      Pulses: Normal pulses.      Heart sounds: S1 normal and S2 normal. No murmur heard.     No friction rub. No gallop. No S3 or S4 sounds.   Pulmonary:      Effort: Pulmonary effort is normal.      Breath sounds: Normal breath sounds. No wheezing, rhonchi or rales.   Chest:       Musculoskeletal:      Right lower leg: No edema.      Left lower leg: No edema.   Skin:     General: Skin is warm and dry.   Neurological:      Mental Status: He is alert and oriented to person, place, and time.   Psychiatric:         Mood and Affect: Mood normal.         Judgment: Judgment normal.          Last Cardiology Tests:  ECG:    NSR 52 bpm  inc RBBB       Echo:    TRANSTHORACIC ECHOCARDIOGRAM REPORT        Patient Name:     MEAGAN Mota Physician:   [id number] Miah Souza MD  Study Date:       7/10/2023      Referring Physician: GABRIELA ROWLAND  MRN/PID:          [id number]       PCP:  Accession/Order#: [id number]   Department Location: [facility]  YOB: 1950      Fellow:  Gender:           M              Nurse:  Admit Date:                      Sonographer:         Lashon Ferrari [facility]  Admission Status: Outpatient     Additional Staff:  Height:           177.80 cm      CC Report to:  Weight:           110.68 kg      Study Type:          Echocardiogram  BSA:              2.27 m2  Blood Pressure: 164 /76 mmHg     Diagnosis/ICD: " I48.0-Paroxysmal atrial fibrillation; I35.0-Nonrheumatic aortic  (valve) stenosis  Indication:    A-fib, Aortic Stenosis  Procedure/CPT: Echo Complete w Full Doppler-40600     Patient History:  Smoker:            Current.  Pertinent History: HTN. A-fib, s/p Cardioversion, AAA Repair, JAYDEN, Covid 6/21.     Study Detail: The following Echo studies were performed: color flow, Doppler, 2D  and M-Mode. Technically challenging study due to body habitus and  prominent lung artifact.        PHYSICIAN INTERPRETATION:  Left Ventricle: The left ventricular systolic function is normal, with an estimated ejection fraction of 65%. There are no regional wall motion abnormalities. The left ventricular cavity size is normal. There is mild concentric left ventricular hypertrophy. Spectral Doppler shows a normal pattern of left ventricular diastolic filling.  Left Atrium: The left atrium is mildly dilated.  Right Ventricle: The right ventricle is normal in size. There is normal right ventriclar wall thickness. There is normal right ventricular global systolic function.  Right Atrium: The right atrium is normal in size.  Aortic Valve: The aortic valve is trileaflet. There is reduced aortic valve left coronary cusp excursion. There is evidence of mild aortic valve stenosis.  There is no evidence of aortic valve regurgitation. The peak instantaneous gradient of the aortic valve is 28.3 mmHg. The mean gradient of the aortic valve is 12.3 mmHg. There is mild to moderate calcification and reduced mobility of the left coronary aortic valve cusp.  Mitral Valve: The mitral valve is normal in structure. There is normal mitral valve leaflet mobility. There is trace to mild mitral valve regurgitation.  Tricuspid Valve: The tricuspid valve is structurally normal. There is normal tricuspid valve leaflet mobility. There is trace tricuspid regurgitation. The Doppler estimated RVSP is within normal limits at 32.3 mmHg.  Pulmonic Valve: The pulmonic  valve is structurally normal. There is trace pulmonic valve regurgitation.  Pericardium: There is no pericardial effusion noted.  Aorta: The aortic root is normal. The Asc Ao is 4.10 cm. There is no dilatation of the aortic arch. There is mild dilatation of the ascending aorta. There is no dilatation of the aortic root. There is plaque visualized in the ascending aorta, which is classified as a Grade 2 [mild (focal or diffuse) intimal thickening of 2-3 mm] atherosclerosis.  Pulmonary Artery: The pulmonary artery is normal in size. The tricuspid regurgitant velocity is 2.70 m/s, and with an estimated right atrial pressure of 3 mmHg, the estimated pulmonary artery pressure is normal with the RVSP at 32.3 mmHg. The estimated PASP is 32 mmHg.  Systemic Veins: The inferior vena cava appears mildly dilated.  In comparison to the previous echocardiogram(s): Compared with study from 2/7/2022, no significant change.        CONCLUSIONS:  1. Left ventricular systolic function is normal with a 65% estimated ejection fraction.  2. Trace to mild mitral valve regurgitation.  3. RVSP within normal limits.  4. Mild aortic valve stenosis.  5. There is mild to moderate calcification and reduced mobility of the left coronary aortic valve cusp.  6. The estimated PASP is 32 mmHg.  7. The peak instantaneous gradient of the aortic valve is 28.3 mmHg.  8. There is plaque visualized in the ascending aorta.     QUANTITATIVE DATA SUMMARY:  2D MEASUREMENTS:  Normal Ranges:  LAs:           5.18 cm    (2.7-4.0cm)  RVIDd:         2.35 cm    (0.9-3.6cm)  IVSd:          1.39 cm    (0.6-1.1cm)  LVPWd:         1.47 cm    (0.6-1.1cm)  LVIDd:         4.57 cm    (3.9-5.9cm)  LVIDs:         2.33 cm  LV Mass Index: 115.9 g/m2  LV % FS        49.0 %     LA VOLUME:  Normal Ranges:  LA Vol A4C:        91.2 ml    (22+/-6mL/m2)  LA Vol A2C:        81.3 ml  LA Vol BP:         88.6 ml  LA Vol Index A4C:  40.2 ml/m2  LA Vol Index A2C:  35.8 ml/m2  LA Vol Index BP:    39.0 ml/m2  LA Area A4C:       27.8 cm2  LA Area A2C:       25.5 cm2  LA Major Axis A4C: 7.2 cm  LA Major Axis A2C: 6.8 cm  LA Volume Index:   39.0 ml/m2  LA Vol A4C:        86.4 ml  LA Vol A2C:        77.8 ml     AORTA MEASUREMENTS:  Normal Ranges:  Asc Ao, d: 4.10 cm (2.1-3.4cm)     LV SYSTOLIC FUNCTION BY 2D PLANIMETRY (MOD):  Normal Ranges:  EF-A4C View: 66.8 % (>=55%)  EF-A2C View: 79.1 %  EF-Biplane:  73.9 %     LV DIASTOLIC FUNCTION:  Normal Ranges:  MV Peak E:        0.83 m/s    (0.7-1.2 m/s)  MV Peak A:        0.74 m/s    (0.42-0.7 m/s)  E/A Ratio:        1.12        (1.0-2.2)  MV e'             0.08 m/s    (>8.0)  MV lateral e'     0.09 m/s  MV medial e'      0.07 m/s  MV A Dur:         110.37 msec  E/e' Ratio:       10.40       (<8.0)  PulmV Sys Marko:    57.08 cm/s  PulmV Torres Marko:   64.02 cm/s  PulmV S/D Marko:    0.89  PulmV A Revs Marko: 26.83 cm/s  PulmV A Revs Dur: 121.79 msec     MITRAL VALVE:  Normal Ranges:  MV DT: 206 msec (150-240msec)     AORTIC VALVE:  Normal Ranges:  AoV Vmax:                2.66 m/s  (<=1.7m/s)  AoV Peak P.3 mmHg (<20mmHg)  AoV Mean P.3 mmHg (1.7-11.5mmHg)  LVOT Max Marko:            1.62 m/s  (<=1.1m/s)  AoV VTI:                 59.89 cm  (18-25cm)  LVOT VTI:                36.07 cm  LVOT Diameter:           2.12 cm   (1.8-2.4cm)  AoV Area, VTI:           2.13 cm2  (2.5-5.5cm2)  AoV Area,Vmax:           2.15 cm2  (2.5-4.5cm2)  AoV Area, planim:        2.36 cm2  (2.5-4.5cm2)  AoV Dimensionless Index: 0.60        RIGHT VENTRICLE:  RV Basal 4.10 cm  RV Mid   2.30 cm  RV Major 8.6 cm  TAPSE:   28.8 mm  RV s'    0.15 m/s     TRICUSPID VALVE/RVSP:  Normal Ranges:  Peak TR Velocity: 2.70 m/s  RV Syst Pressure: 32.3 mmHg (< 30mmHg)  IVC Diam:         2.00 cm     PULMONIC VALVE:  Normal Ranges:  PV Max Marko: 1.1 m/s  (0.6-0.9m/s)  PV Max P.2 mmHg     Pulmonary Veins:  PulmV A Revs Dur: 121.79 msec  PulmV A Revs Marko: 26.83 cm/s  PulmV Torres Marko:    64.02 cm/s  PulmV S/D Marko:    0.89  PulmV Sys Marko:    57.08 cm/s     AORTA:  Asc Ao Diam 4.13 cm        [id number] Miah Souza MD  Electronically signed on 7/12/2023 at 8:07:32 AM      Lab review: I have Chemistry CMP:   Lab Results   Component Value Date    ALBUMIN 3.8 04/12/2023    CALCIUM 8.8 04/12/2023    CO2 23 (L) 04/12/2023    CREATININE 1.1 04/12/2023    GLUCOSE 142 (H) 04/12/2023    BILITOT 0.4 04/12/2023    PROT 6.4 04/12/2023    ALT 18 04/12/2023    AST 21 04/12/2023    ALKPHOS 98 04/12/2023   , Chemistry BMP   Lab Results   Component Value Date    GLUCOSE 142 (H) 04/12/2023    CALCIUM 8.8 04/12/2023    CO2 23 (L) 04/12/2023    CREATININE 1.1 04/12/2023   , and CBC:  Lab Results   Component Value Date    WBC 7.0 02/07/2023    RBC 4.87 02/07/2023    HGB 14.6 02/07/2023    HCT 45.6 02/07/2023    MCV 93.6 02/07/2023    MCH 30.0 02/07/2023    MCHC 32.0 02/07/2023    MPV 11.8 02/07/2023    NRBC 0 02/07/2023       Assessment/Plan   Problem List Items Addressed This Visit             ICD-10-CM    Paroxysmal atrial fibrillation (CMS/HCC) I48.0    Relevant Orders    ECG 12 lead (Clinic Performed)     We discussed that he would increase his eliquis to 5 mg bid so that he is protected from stroke given his elevated CHADSVASC score.   He will have blood work done today. If labs good - I would suggest based on mildly elevated LA sie that he would be a good candidate for RFA. This would get him off of the amiodarone long term and then we could consider DIMITRI clip with CT surgery to avoid any antiplatelet.     Eloina Gonsalves MD

## 2024-04-19 LAB
ATRIAL RATE: 52 BPM
P AXIS: 29 DEGREES
P OFFSET: 215 MS
P ONSET: 145 MS
PR INTERVAL: 156 MS
Q ONSET: 223 MS
QRS COUNT: 8 BEATS
QRS DURATION: 94 MS
QT INTERVAL: 488 MS
QTC CALCULATION(BAZETT): 453 MS
QTC FREDERICIA: 465 MS
R AXIS: 15 DEGREES
T AXIS: 39 DEGREES
T OFFSET: 467 MS
VENTRICULAR RATE: 52 BPM

## 2024-04-20 ENCOUNTER — PATIENT MESSAGE (OUTPATIENT)
Dept: CARDIOLOGY | Facility: CLINIC | Age: 74
End: 2024-04-20
Payer: MEDICARE

## 2024-04-23 ENCOUNTER — ANTICOAGULATION - WARFARIN VISIT (OUTPATIENT)
Dept: CARDIOLOGY | Facility: CLINIC | Age: 74
End: 2024-04-23
Payer: MEDICARE

## 2024-04-23 DIAGNOSIS — I48.0 PAROXYSMAL ATRIAL FIBRILLATION (MULTI): ICD-10-CM

## 2024-04-23 DIAGNOSIS — I48.0 PAROXYSMAL ATRIAL FIBRILLATION (MULTI): Primary | ICD-10-CM

## 2024-04-23 RX ORDER — WARFARIN SODIUM 5 MG/1
TABLET ORAL
Qty: 30 TABLET | Refills: 11 | Status: SHIPPED | OUTPATIENT
Start: 2024-04-23 | End: 2024-05-03 | Stop reason: SDUPTHER

## 2024-04-23 NOTE — PROGRESS NOTES
Received signed AMS form from MD Gonsalves.  See telephone note from 4/23/23 from Sangeeta Martinez RN.  Pt. Previously on Eliquis for A-fib and had side effects.  Pt. Was previously on warfarin before Eliquis.  Pt. will re-start 5 mg daily coumadin today or tomorrow when he picks up from pharmacy.  He was scheduled to have NPV and INR checked on 5/1/24 at [facility] at 2:00 per his availability. He was given address and phone number to clinic.  Pt. Also instructed to bring in warfarin prescription for verification.   He will have his CBC drawn also on 5/1/24 at [facility].   PT TO HAVE A-FIB ABLATION WHEN HE IS THERAPEUTIC FOR 4 CONSECUTIVE WEEKS ( this is in AMS order form).  PLEASE LET SANGEETA MARTINEZ RN KNOW WHEN HE IS THERAPEUTIC FOR THE FIRST TIME.  Mariia Beckwith RN

## 2024-04-23 NOTE — TELEPHONE ENCOUNTER
Spoke with patient status post receipt of his message informing us that he sustained a 45 minutes nose bleed and petechia since restarting Eliquis 5 mg twice daily. Patient requesting transition to Coumadin. Issue discussed with Dr. Gonsalves. Patient informed Coumadin ordered, coumadin clinic referral initiated and CBC ordered per Dr. Gonsalves's order. Patient instructed to obtain ENT referral from his PCP so that his nose can be cauterized before the ablation to prevent bleeding during the procedure. Asked Anderson to leave me a voice mail message with the date of his first therapeutic INR result so we can tentatively plan his ablation 4 weeks from that. Patient verbalized understanding and is in agreement with this plan.

## 2024-04-24 ENCOUNTER — LAB (OUTPATIENT)
Dept: LAB | Facility: LAB | Age: 74
End: 2024-04-24
Payer: MEDICARE

## 2024-04-24 ENCOUNTER — PATIENT MESSAGE (OUTPATIENT)
Dept: PRIMARY CARE | Facility: CLINIC | Age: 74
End: 2024-04-24

## 2024-04-24 DIAGNOSIS — I48.0 PAROXYSMAL ATRIAL FIBRILLATION (MULTI): ICD-10-CM

## 2024-04-24 DIAGNOSIS — R04.0 EPISTAXIS: Primary | ICD-10-CM

## 2024-04-24 LAB
ERYTHROCYTE [DISTWIDTH] IN BLOOD BY AUTOMATED COUNT: 13.7 % (ref 11.5–14.5)
HCT VFR BLD AUTO: 44.9 % (ref 41–52)
HGB BLD-MCNC: 14.1 G/DL (ref 13.5–17.5)
MCH RBC QN AUTO: 30.2 PG (ref 26–34)
MCHC RBC AUTO-ENTMCNC: 31.4 G/DL (ref 32–36)
MCV RBC AUTO: 96 FL (ref 80–100)
NRBC BLD-RTO: 0 /100 WBCS (ref 0–0)
PLATELET # BLD AUTO: 117 X10*3/UL (ref 150–450)
RBC # BLD AUTO: 4.67 X10*6/UL (ref 4.5–5.9)
WBC # BLD AUTO: 6.4 X10*3/UL (ref 4.4–11.3)

## 2024-04-24 PROCEDURE — 85027 COMPLETE CBC AUTOMATED: CPT

## 2024-04-24 PROCEDURE — 36415 COLL VENOUS BLD VENIPUNCTURE: CPT

## 2024-05-01 ENCOUNTER — APPOINTMENT (OUTPATIENT)
Dept: CARDIOLOGY | Facility: CLINIC | Age: 74
End: 2024-05-01
Payer: MEDICARE

## 2024-05-01 ENCOUNTER — ANTICOAGULATION - WARFARIN VISIT (OUTPATIENT)
Dept: CARDIOLOGY | Facility: CLINIC | Age: 74
End: 2024-05-01
Payer: MEDICARE

## 2024-05-01 DIAGNOSIS — I48.0 PAROXYSMAL ATRIAL FIBRILLATION (MULTI): Primary | ICD-10-CM

## 2024-05-01 LAB
POC INR: 4.4
POC PROTHROMBIN TIME: NORMAL

## 2024-05-01 NOTE — PROGRESS NOTES
Patient identification verified with 2 identifiers.    Location: [facility] - [address] [place] [id number] [phone]     Referring Physician: Dr. Abi Gonsalves  Enrollment/ Re-enrollment date: 2025   INR Goal: 2.0-3.0  INR monitoring is per [facility] protocol.  Anticoagulation Medication: warfarin  Indication: Atrial Fibrillation/Atrial Flutter    Subjective   Bleeding signs/symptoms: No    Bruising: No   Major bleeding event: No  Thrombosis signs/symptoms: No  Thromboembolic event: No  Missed doses: No  Extra doses: No  Medication changes: No  Dietary changes: No  Change in health: No  Change in activity: No  Alcohol: No  Other concerns: No    Upcoming Procedures:  Does the Patient Have any upcoming procedures that require interruption in anticoagulation therapy? no  Does the patient require bridging? no      Anticoagulation Summary  As of 2024      INR goal:  2.0-3.0   TTR:  --   INR used for dosin.40 (2024)   Weekly warfarin total:  25 mg               Assessment/Plan   Supratherapeutic     1. New dose:  Hold Warfarin  & 5/3.  Reduce TWD 10%.     2. Next INR:  5 days      Education provided to patient during the visit:  Patient instructed to call in interim with questions, concerns and changes.   Patient educated on interactions between medications and warfarin.   Patient educated on dietary consistency in vitamin k consumption.   Patient educated on affects of alcohol consumption while taking warfarin.   Patient educated on signs of bleeding/clotting.   Patient educated on compliance with dosing, follow up appointments, and prescribed plan of care.

## 2024-05-03 ENCOUNTER — OFFICE VISIT (OUTPATIENT)
Dept: PRIMARY CARE | Facility: CLINIC | Age: 74
End: 2024-05-03
Payer: MEDICARE

## 2024-05-03 VITALS
HEART RATE: 52 BPM | SYSTOLIC BLOOD PRESSURE: 124 MMHG | DIASTOLIC BLOOD PRESSURE: 72 MMHG | WEIGHT: 255 LBS | TEMPERATURE: 97.7 F | OXYGEN SATURATION: 97 % | HEIGHT: 70 IN | BODY MASS INDEX: 36.51 KG/M2

## 2024-05-03 DIAGNOSIS — Z01.89 ENCOUNTER FOR ROUTINE LABORATORY TESTING: ICD-10-CM

## 2024-05-03 DIAGNOSIS — R91.8 MULTIPLE PULMONARY NODULES: ICD-10-CM

## 2024-05-03 DIAGNOSIS — E03.8 SUBCLINICAL HYPOTHYROIDISM: ICD-10-CM

## 2024-05-03 DIAGNOSIS — I48.3 TYPICAL ATRIAL FLUTTER (MULTI): ICD-10-CM

## 2024-05-03 DIAGNOSIS — E55.9 VITAMIN D DEFICIENCY: ICD-10-CM

## 2024-05-03 DIAGNOSIS — I48.0 PAROXYSMAL ATRIAL FIBRILLATION (MULTI): ICD-10-CM

## 2024-05-03 DIAGNOSIS — Z79.01 LONG TERM CURRENT USE OF ANTICOAGULANT: ICD-10-CM

## 2024-05-03 DIAGNOSIS — E78.2 MIXED HYPERLIPIDEMIA: ICD-10-CM

## 2024-05-03 DIAGNOSIS — N18.31 STAGE 3A CHRONIC KIDNEY DISEASE (MULTI): ICD-10-CM

## 2024-05-03 DIAGNOSIS — I49.3 PVC (PREMATURE VENTRICULAR CONTRACTION): ICD-10-CM

## 2024-05-03 DIAGNOSIS — N40.0 BENIGN PROSTATIC HYPERPLASIA WITHOUT LOWER URINARY TRACT SYMPTOMS: ICD-10-CM

## 2024-05-03 DIAGNOSIS — I10 PRIMARY HYPERTENSION: ICD-10-CM

## 2024-05-03 DIAGNOSIS — N52.9 ERECTILE DYSFUNCTION, UNSPECIFIED ERECTILE DYSFUNCTION TYPE: ICD-10-CM

## 2024-05-03 DIAGNOSIS — M15.9 PRIMARY OSTEOARTHRITIS INVOLVING MULTIPLE JOINTS: ICD-10-CM

## 2024-05-03 DIAGNOSIS — D69.6 THROMBOCYTOPENIA (CMS-HCC): ICD-10-CM

## 2024-05-03 DIAGNOSIS — E66.9 CLASS 2 OBESITY WITHOUT SERIOUS COMORBIDITY WITH BODY MASS INDEX (BMI) OF 36.0 TO 36.9 IN ADULT, UNSPECIFIED OBESITY TYPE: Primary | ICD-10-CM

## 2024-05-03 DIAGNOSIS — I27.20 PULMONARY HYPERTENSION (MULTI): ICD-10-CM

## 2024-05-03 DIAGNOSIS — G47.33 OSA (OBSTRUCTIVE SLEEP APNEA): ICD-10-CM

## 2024-05-03 DIAGNOSIS — Z79.899 LONG TERM CURRENT USE OF AMIODARONE: ICD-10-CM

## 2024-05-03 PROBLEM — B35.3 TINEA PEDIS OF BOTH FEET: Status: RESOLVED | Noted: 2017-01-20 | Resolved: 2024-05-03

## 2024-05-03 PROBLEM — I71.60 THORACOABDOMINAL AORTIC ANEURYSM (CMS-HCC): Status: RESOLVED | Noted: 2023-08-26 | Resolved: 2024-05-03

## 2024-05-03 PROBLEM — L30.9 ECZEMA: Status: RESOLVED | Noted: 2023-08-26 | Resolved: 2024-05-03

## 2024-05-03 PROBLEM — Z98.890 HISTORY OF AAA (ABDOMINAL AORTIC ANEURYSM) REPAIR: Status: ACTIVE | Noted: 2024-05-03

## 2024-05-03 PROBLEM — M19.90 OA (OSTEOARTHRITIS): Status: ACTIVE | Noted: 2023-10-27

## 2024-05-03 PROBLEM — H04.129 DRY EYE SYNDROME: Status: RESOLVED | Noted: 2023-08-26 | Resolved: 2024-05-03

## 2024-05-03 PROBLEM — S61.210A LACERATION OF RIGHT INDEX FINGER, INITIAL ENCOUNTER: Status: RESOLVED | Noted: 2023-08-26 | Resolved: 2024-05-03

## 2024-05-03 PROBLEM — B35.1 TINEA UNGUIUM: Status: RESOLVED | Noted: 2017-01-20 | Resolved: 2024-05-03

## 2024-05-03 PROBLEM — N18.9 CKD (CHRONIC KIDNEY DISEASE): Status: ACTIVE | Noted: 2024-05-03

## 2024-05-03 PROBLEM — I48.92 ATRIAL FLUTTER (MULTI): Status: ACTIVE | Noted: 2023-08-26

## 2024-05-03 PROBLEM — E87.1 HYPONATREMIA: Status: RESOLVED | Noted: 2023-08-26 | Resolved: 2024-05-03

## 2024-05-03 PROBLEM — I71.40 ABDOMINAL AORTIC ANEURYSM (AAA) WITHOUT RUPTURE (CMS-HCC): Status: RESOLVED | Noted: 2023-08-26 | Resolved: 2024-05-03

## 2024-05-03 PROBLEM — I48.91 ATRIAL FIBRILLATION (MULTI): Status: ACTIVE | Noted: 2023-08-26

## 2024-05-03 PROBLEM — H57.9 ALLERGIC EYE REACTION: Status: RESOLVED | Noted: 2023-08-26 | Resolved: 2024-05-03

## 2024-05-03 PROBLEM — E78.5 HLD (HYPERLIPIDEMIA): Status: ACTIVE | Noted: 2023-08-26

## 2024-05-03 PROBLEM — L85.3 XEROSIS CUTIS: Status: RESOLVED | Noted: 2017-01-20 | Resolved: 2024-05-03

## 2024-05-03 PROCEDURE — 1160F RVW MEDS BY RX/DR IN RCRD: CPT | Performed by: STUDENT IN AN ORGANIZED HEALTH CARE EDUCATION/TRAINING PROGRAM

## 2024-05-03 PROCEDURE — 99215 OFFICE O/P EST HI 40 MIN: CPT | Performed by: STUDENT IN AN ORGANIZED HEALTH CARE EDUCATION/TRAINING PROGRAM

## 2024-05-03 PROCEDURE — 1126F AMNT PAIN NOTED NONE PRSNT: CPT | Performed by: STUDENT IN AN ORGANIZED HEALTH CARE EDUCATION/TRAINING PROGRAM

## 2024-05-03 PROCEDURE — 1157F ADVNC CARE PLAN IN RCRD: CPT | Performed by: STUDENT IN AN ORGANIZED HEALTH CARE EDUCATION/TRAINING PROGRAM

## 2024-05-03 PROCEDURE — 1159F MED LIST DOCD IN RCRD: CPT | Performed by: STUDENT IN AN ORGANIZED HEALTH CARE EDUCATION/TRAINING PROGRAM

## 2024-05-03 PROCEDURE — 3074F SYST BP LT 130 MM HG: CPT | Performed by: STUDENT IN AN ORGANIZED HEALTH CARE EDUCATION/TRAINING PROGRAM

## 2024-05-03 PROCEDURE — G2211 COMPLEX E/M VISIT ADD ON: HCPCS | Performed by: STUDENT IN AN ORGANIZED HEALTH CARE EDUCATION/TRAINING PROGRAM

## 2024-05-03 PROCEDURE — 3078F DIAST BP <80 MM HG: CPT | Performed by: STUDENT IN AN ORGANIZED HEALTH CARE EDUCATION/TRAINING PROGRAM

## 2024-05-03 PROCEDURE — 1036F TOBACCO NON-USER: CPT | Performed by: STUDENT IN AN ORGANIZED HEALTH CARE EDUCATION/TRAINING PROGRAM

## 2024-05-03 RX ORDER — ACETAMINOPHEN 500 MG
500-1000 TABLET ORAL 3 TIMES DAILY PRN
Start: 2024-05-03

## 2024-05-03 RX ORDER — WARFARIN SODIUM 5 MG/1
TABLET ORAL
Qty: 90 TABLET | Refills: 3 | Status: SHIPPED | OUTPATIENT
Start: 2024-05-03

## 2024-05-03 RX ORDER — PRAVASTATIN SODIUM 40 MG/1
40 TABLET ORAL NIGHTLY
Qty: 90 TABLET | Refills: 3 | Status: SHIPPED | OUTPATIENT
Start: 2024-05-03 | End: 2025-05-03

## 2024-05-03 RX ORDER — ERGOCALCIFEROL 1.25 MG/1
50000 CAPSULE ORAL
Qty: 12 CAPSULE | Refills: 0 | Status: SHIPPED | OUTPATIENT
Start: 2024-05-05 | End: 2024-07-28

## 2024-05-03 RX ORDER — AMIODARONE HYDROCHLORIDE 200 MG/1
TABLET ORAL
Qty: 66 TABLET | Refills: 3 | Status: SHIPPED | OUTPATIENT
Start: 2024-05-03 | End: 2025-05-03

## 2024-05-03 RX ORDER — LOSARTAN POTASSIUM 50 MG/1
50 TABLET ORAL DAILY
Qty: 90 TABLET | Refills: 3 | Status: SHIPPED | OUTPATIENT
Start: 2024-05-03 | End: 2025-05-03

## 2024-05-03 RX ORDER — METOPROLOL TARTRATE 50 MG/1
50 TABLET ORAL 3 TIMES DAILY
Start: 2024-05-03

## 2024-05-03 RX ORDER — ACETAMINOPHEN 500 MG
50 TABLET ORAL DAILY
Start: 2024-05-03

## 2024-05-03 RX ORDER — METOPROLOL TARTRATE 100 MG/1
50 TABLET ORAL 3 TIMES DAILY
COMMUNITY
Start: 2024-03-29 | End: 2024-05-03 | Stop reason: SDUPTHER

## 2024-05-03 ASSESSMENT — ENCOUNTER SYMPTOMS
RESPIRATORY NEGATIVE: 1
CONSTITUTIONAL NEGATIVE: 1
CARDIOVASCULAR NEGATIVE: 1
GASTROINTESTINAL NEGATIVE: 1

## 2024-05-03 ASSESSMENT — PATIENT HEALTH QUESTIONNAIRE - PHQ9
SUM OF ALL RESPONSES TO PHQ9 QUESTIONS 1 AND 2: 0
2. FEELING DOWN, DEPRESSED OR HOPELESS: NOT AT ALL
1. LITTLE INTEREST OR PLEASURE IN DOING THINGS: NOT AT ALL

## 2024-05-03 ASSESSMENT — PAIN SCALES - GENERAL: PAINLEVEL: 0-NO PAIN

## 2024-05-03 NOTE — PROGRESS NOTES
[facility]: [facility]  PROGRESS NOTE      Anderson Barlow is a 73 y.o. male that is presenting today for Follow-up.    Assessment/Plan   Diagnoses and all orders for this visit:  Class 2 obesity without serious comorbidity with body mass index (BMI) of 36.0 to 36.9 in adult, unspecified obesity type  -     Hepatic Function Panel; Future  Benign prostatic hyperplasia without lower urinary tract symptoms  Erectile dysfunction, unspecified erectile dysfunction type  Primary osteoarthritis involving multiple joints  -     acetaminophen (Tylenol Extra Strength) 500 mg tablet; Take 1-2 tablets (500-1,000 mg) by mouth 3 times a day as needed for mild pain (1 - 3), moderate pain (4 - 6) or fever (temp greater than 38.0 C).  PVC (premature ventricular contraction)  -     metoprolol tartrate (Lopressor) 50 mg tablet; Take 1 tablet (50 mg) by mouth 3 times a day.  -     Magnesium; Future  -     Phosphorus; Future  Long term current use of anticoagulant  -     warfarin (Coumadin) 5 mg tablet; Take as directed by Coumadin clinic.  Stage 3a chronic kidney disease (Multi)  -     ergocalciferol (Vitamin D-2) 1.25 MG (02701 UT) capsule; Take 1 capsule (50,000 Units) by mouth 1 (one) time per week.  -     cholecalciferol (Vitamin D3) 50 mcg (2,000 unit) capsule; Take 1 capsule (50 mcg) by mouth once daily.  -     Basic Metabolic Panel; Future  -     Vitamin D 25-Hydroxy,Total (for eval of Vitamin D levels); Future  Long term current use of amiodarone  -     amiodarone (Pacerone) 200 mg tablet; Take 1 tablet (200 mg) by mouth 4 times a week AND 0.5 tablets (100 mg) 3 times a week.  Paroxysmal atrial fibrillation (Multi)  -     amiodarone (Pacerone) 200 mg tablet; Take 1 tablet (200 mg) by mouth 4 times a week AND 0.5 tablets (100 mg) 3 times a week.  -     metoprolol tartrate (Lopressor) 50 mg tablet; Take 1 tablet (50 mg) by mouth 3 times a day.  -     warfarin (Coumadin) 5 mg tablet; Take as directed by  Coumadin clinic.  -     Magnesium; Future  -     Phosphorus; Future  Pulmonary hypertension (Multi)  -     losartan (Cozaar) 50 mg tablet; Take 1 tablet (50 mg) by mouth once daily.  Thrombocytopenia (CMS-HCC)  -     CBC and Auto Differential; Future  JAYDEN (obstructive sleep apnea)  Multiple pulmonary nodules  Typical atrial flutter (Multi)  -     metoprolol tartrate (Lopressor) 50 mg tablet; Take 1 tablet (50 mg) by mouth 3 times a day.  -     Magnesium; Future  -     Phosphorus; Future  Subclinical hypothyroidism  Mixed hyperlipidemia  -     pravastatin (Pravachol) 40 mg tablet; Take 1 tablet (40 mg) by mouth once daily at bedtime.  -     Hepatic Function Panel; Future  Primary hypertension  -     losartan (Cozaar) 50 mg tablet; Take 1 tablet (50 mg) by mouth once daily.  -     metoprolol tartrate (Lopressor) 50 mg tablet; Take 1 tablet (50 mg) by mouth 3 times a day.  -     Basic Metabolic Panel; Future  Vitamin D deficiency  -     ergocalciferol (Vitamin D-2) 1.25 MG (55813 UT) capsule; Take 1 capsule (50,000 Units) by mouth 1 (one) time per week.  -     cholecalciferol (Vitamin D3) 50 mcg (2,000 unit) capsule; Take 1 capsule (50 mcg) by mouth once daily.  -     Vitamin D 25-Hydroxy,Total (for eval of Vitamin D levels); Future  Encounter for routine laboratory testing  -     Follow Up In Primary Care  -     CBC and Auto Differential; Future  -     Basic Metabolic Panel; Future  -     Hepatic Function Panel; Future  -     Vitamin D 25-Hydroxy,Total (for eval of Vitamin D levels); Future  -     Magnesium; Future  -     Phosphorus; Future    - Significant medication and problem list reconciliation done today.  - Patient had labwork done for this appointment. Discussed today.  - Vitamin D deficiency. Will require prescription supplementation in addition to OTC supplementation. Ordered today.  - Thrombocytopenia. Relatively stable. Will not make changes at this time. Continue to monitor.  - Patient's cholesterol  mildly outside of where we would like it to be. Encouraged continued diet and exercise modification; I do not want to make changes to his statin dosing at this time.  - Will order labwork for the patient's next appointment.  - Vitals look great. Do not need to make changes at this time.  - Encouraged continued diet and exercise modification.  - Encouraged continued use of CPAP. Patient notes that he does not have one. Encouraged him to consider getting one that works for him.  - Patient has been working with electrophysiology to attempt to get a catheter ablation for his atrial fibrillation. Had to get transitioned from eliquis to warfarin due to persistent blood clots; since that time, the patient has had complete resolution of said blood clots. Will defer management to EP.   - Patient otherwise feels well. Do not need to make any other changes at this time.    Subjective   - The patient otherwise feels well and denies any acute symptoms or concerns at this time.  - The patient denies any changes or progression of their chronic medical problems.  - The patient denies any problems or concerns with their medications.      Review of Systems   Constitutional: Negative.    Respiratory: Negative.     Cardiovascular: Negative.    Gastrointestinal: Negative.    All other systems reviewed and are negative.     Objective   Vitals:    05/03/24 0923   BP: 124/72   Pulse: 52   Temp: 36.5 °C (97.7 °F)   SpO2: 97%      Body mass index is 36.59 kg/m².  Physical Exam  Vitals and nursing note reviewed.   Constitutional:       General: He is not in acute distress.  Neck:      Vascular: No carotid bruit.   Cardiovascular:      Rate and Rhythm: Normal rate and regular rhythm.      Heart sounds: Normal heart sounds.   Pulmonary:      Effort: Pulmonary effort is normal.      Breath sounds: Normal breath sounds.   Musculoskeletal:         General: No swelling.   Neurological:      Mental Status: He is alert. Mental status is at baseline.  "  Psychiatric:         Mood and Affect: Mood normal.       Diagnostic Results   Lab Results   Component Value Date    GLUCOSE 95 04/09/2024    CALCIUM 9.3 04/09/2024     04/09/2024    K 4.6 04/09/2024    CO2 26 04/09/2024     04/09/2024    BUN 14 04/09/2024    CREATININE 1.30 04/09/2024     Lab Results   Component Value Date    ALT 17 04/09/2024    AST 20 04/09/2024    ALKPHOS 115 04/09/2024    BILITOT 0.6 04/09/2024     Lab Results   Component Value Date    WBC 6.4 04/24/2024    HGB 14.1 04/24/2024    HCT 44.9 04/24/2024    MCV 96 04/24/2024     (L) 04/24/2024     Lab Results   Component Value Date    CHOL 201 (H) 04/09/2024    CHOL 158 03/03/2021    CHOL 163 01/06/2021     Lab Results   Component Value Date    HDL 41.0 04/09/2024    HDL 37 (L) 03/03/2021    HDL 40 (L) 01/06/2021     Lab Results   Component Value Date    LDLCALC 133 (H) 04/09/2024    LDLCALC 97 03/03/2021    LDLCALC 99 01/06/2021     Lab Results   Component Value Date    TRIG 134 04/09/2024    TRIG 122 03/03/2021    TRIG 119 01/06/2021     No components found for: \"CHOLHDL\"  Lab Results   Component Value Date    HGBA1C 5.6 04/09/2024     Other labs not included in the list above were reviewed either before or during this encounter.    History    Past Medical History:   Diagnosis Date    Aneurysm (CMS-HCC)     Atrial fibrillation (Multi)     Clotting disorder (Multi)     Hypertension      Past Surgical History:   Procedure Laterality Date    ARTERIAL ANEURYSM REPAIR      CATARACT EXTRACTION  06/23/2016    Cataract Surgery    COLONOSCOPY  06/23/2016    Complete Colonoscopy    CT ABDOMEN PELVIS ANGIOGRAM W AND/OR WO IV CONTRAST  11/04/2020    CT ABDOMEN PELVIS ANGIOGRAM W AND/OR WO IV CONTRAST [facility]    HEMORRHOID SURGERY  06/23/2016    Hemorrhoidectomy    OTHER SURGICAL HISTORY  03/28/2022    Abdominal aortic aneurysm repair     Family History   Problem Relation Name Age of Onset    Alzheimer's disease Mother      " "Liver cancer Other SIBLINGS      Social History     Socioeconomic History    Marital status:      Spouse name: Not on file    Number of children: Not on file    Years of education: Not on file    Highest education level: Not on file   Occupational History    Not on file   Tobacco Use    Smoking status: Former     Types: Cigarettes     Passive exposure: Past    Smokeless tobacco: Never    Tobacco comments:     Pt quit about 10 years ago   Vaping Use    Vaping status: Never Used   Substance and Sexual Activity    Alcohol use: Not Currently    Drug use: Never    Sexual activity: Defer     Partners: Female   Other Topics Concern    Not on file   Social History Narrative    Not on file     Social Determinants of Health     Financial Resource Strain: Not on file   Food Insecurity: Not on file   Transportation Needs: Not on file   Physical Activity: Not on file   Stress: Not on file   Social Connections: Not on file   Intimate Partner Violence: Not on file   Housing Stability: Not on file     Allergies   Allergen Reactions    Codeine Unknown    Hydrocodone Other     MENTAL STATUS CHANGE    Hydrocodone-Acetaminophen Other     AMS    Lisinopril Other and Unknown     CHOKING    Oxycodone-Acetaminophen Unknown    Succinylcholine Other and Unknown     \"CAUSED FATHER'S PROSTATE CANCER\"    Cephalexin Diarrhea and Palpitations     VOMITING  HEART RACING  FACE FLUSHING    Penicillins Rash     Current Outpatient Medications on File Prior to Visit   Medication Sig Dispense Refill    ketoconazole (NIZOral) 2 % cream Apply topically once daily as needed.      triamcinolone (Kenalog) 0.1 % cream APPLY TWICE DAILY TO RASH UP TO 2 WEEKS/MONTH AS NEEDED.      [DISCONTINUED] acetaminophen (Tylenol Extra Strength) 500 mg tablet Take 1 tablet (500 mg) by mouth every 4 hours if needed.      [DISCONTINUED] amiodarone (Pacerone) 200 mg tablet 1 tablet once/day every Tuesday, Thursday, Saturday, and Sunday. (Patient taking differently: 1 " tablet once/day every Tuesday, Thursday, Saturday, and Sunday.; 1/2 tab mon, wed, fri)      [DISCONTINUED] aspirin-acetaminophen-caffeine (Excedrin Extra Strength) 250-250-65 mg tablet Take 2 tablets by mouth once daily.      [DISCONTINUED] cholecalciferol (Vitamin D3) 25 MCG (1000 UT) capsule Take 1 capsule (25 mcg) by mouth once daily.      [DISCONTINUED] losartan (Cozaar) 50 mg tablet TAKE 1 TABLET BY MOUTH EVERY DAY FOR 90 DAYS 90 tablet 3    [DISCONTINUED] metoprolol tartrate (Lopressor) 100 mg tablet Take 0.5 tablets (50 mg) by mouth 3 times a day.      [DISCONTINUED] pravastatin (Pravachol) 40 mg tablet Take 1 tablet (40 mg) by mouth once daily at bedtime. 90 tablet 3    [DISCONTINUED] warfarin (Coumadin) 5 mg tablet Take as directed by Coumadin clinic. 30 tablet 11    [DISCONTINUED] baclofen (Lioresal) 20 mg tablet Take 0.5 tablets (10 mg) by mouth 3 times a day as needed for muscle spasms for up to 5 days. 15 tablet 0    [DISCONTINUED] metoprolol tartrate (Lopressor) 50 mg tablet Take 1 tablet by mouth 3 times a day. (Patient not taking: Reported on 5/3/2024)       No current facility-administered medications on file prior to visit.     Immunization History   Administered Date(s) Administered    Flu vaccine, quadrivalent, high-dose, preservative free, age 65y+ (FLUZONE) 10/22/2021, 10/27/2023    Influenza, High Dose Seasonal, Preservative Free 10/19/2016, 02/09/2017, 11/05/2019    Influenza, Seasonal, Quadrivalent, Adjuvanted 10/13/2020    Influenza, seasonal, injectable 11/24/2007, 09/03/2009, 11/11/2010, 10/31/2011, 09/25/2012, 10/23/2013, 11/12/2014, 11/12/2015, 10/24/2016    Influenza, trivalent, adjuvanted 11/20/2017, 10/29/2018    Pfizer Purple Cap SARS-CoV-2 03/05/2021, 04/02/2021, 01/01/2022, 01/20/2022    Pneumococcal conjugate vaccine, 13-valent (PREVNAR 13) 08/17/2015    Pneumococcal polysaccharide vaccine, 23-valent, age 2 years and older (PNEUMOVAX 23) 10/23/2013    Td vaccine, age 7 years and  older (TDVAX) 12/30/1994    Tdap vaccine, age 7 year and older (BOOSTRIX, ADACEL) 09/06/2012, 01/01/2014, 11/29/2017    Zoster, live 09/03/2009     Patient's medical history was reviewed and updated either before or during this encounter.       Vazquez Vu MD

## 2024-05-03 NOTE — PATIENT INSTRUCTIONS
- Significant medication and problem list reconciliation done today.  - Patient had labwork done for this appointment. Discussed today.  - Vitamin D deficiency. Will require prescription supplementation in addition to OTC supplementation. Ordered today.  - Thrombocytopenia. Relatively stable. Will not make changes at this time. Continue to monitor.  - Patient's cholesterol mildly outside of where we would like it to be. Encouraged continued diet and exercise modification; I do not want to make changes to his statin dosing at this time.  - Will order labwork for the patient's next appointment.  - Vitals look great. Do not need to make changes at this time.  - Encouraged continued diet and exercise modification.  - Encouraged continued use of CPAP. Patient notes that he does not have one. Encouraged him to consider getting one that works for him.  - Patient has been working with electrophysiology to attempt to get a catheter ablation for his atrial fibrillation. Had to get transitioned from eliquis to warfarin due to persistent blood clots; since that time, the patient has had complete resolution of said blood clots. Will defer management to EP.   - Patient otherwise feels well. Do not need to make any other changes at this time.

## 2024-05-06 ENCOUNTER — ANTICOAGULATION - WARFARIN VISIT (OUTPATIENT)
Dept: CARDIOLOGY | Facility: CLINIC | Age: 74
End: 2024-05-06
Payer: MEDICARE

## 2024-05-06 DIAGNOSIS — I48.0 PAROXYSMAL ATRIAL FIBRILLATION (MULTI): Primary | ICD-10-CM

## 2024-05-06 LAB
POC INR: 4.5
POC PROTHROMBIN TIME: NORMAL

## 2024-05-06 PROCEDURE — 99211 OFF/OP EST MAY X REQ PHY/QHP: CPT

## 2024-05-06 NOTE — PROGRESS NOTES
Patient identification verified with 2 identifiers.    Location: [facility] - [address] [place], [place] [address] [phone]     Referring Physician: Dr. Abi Gonsalves  Enrollment/ Re-enrollment date: 4/3/2025   INR Goal: 2.0-3.0  INR monitoring is per [facility] protocol.  Anticoagulation Medication: warfarin  Indication: Atrial Fibrillation/Atrial Flutter    Subjective   Bleeding signs/symptoms: No    Bruising: No   Major bleeding event: No  Thrombosis signs/symptoms: No  Thromboembolic event: No  Missed doses: No  Extra doses: No  Medication changes: No  Dietary changes: No  Change in health: No  Change in activity: No  Alcohol: No  Other concerns: Yes  Pt has been having nose bleeds.    Upcoming Procedures:  Does the Patient Have any upcoming procedures that require interruption in anticoagulation therapy? no  Does the patient require bridging? no      Anticoagulation Summary  As of 2024      INR goal:  2.0-3.0   TTR:  0.0% (3 d)   INR used for dosin.50 (2024)   Weekly warfarin total:  32.5 mg               Assessment/Plan   Supratherapeutic     1. New dose:  Hold Warfarin 57 & 5/8.     2. Next INR:  4 days      Education provided to patient during the visit:  Patient instructed to call in interim with questions, concerns and changes.   Patient educated on interactions between medications and warfarin.   Patient educated on dietary consistency in vitamin k consumption.   Patient educated on affects of alcohol consumption while taking warfarin.   Patient educated on signs of bleeding/clotting.   Patient educated on compliance with dosing, follow up appointments, and prescribed plan of care.

## 2024-05-10 ENCOUNTER — ANTICOAGULATION - WARFARIN VISIT (OUTPATIENT)
Dept: CARDIOLOGY | Facility: CLINIC | Age: 74
End: 2024-05-10
Payer: MEDICARE

## 2024-05-10 DIAGNOSIS — I48.0 PAROXYSMAL ATRIAL FIBRILLATION (MULTI): Primary | ICD-10-CM

## 2024-05-10 DIAGNOSIS — I48.19 PERSISTENT ATRIAL FIBRILLATION (MULTI): ICD-10-CM

## 2024-05-10 LAB
POC INR: 3.1
POC PROTHROMBIN TIME: NORMAL

## 2024-05-10 PROCEDURE — 99211 OFF/OP EST MAY X REQ PHY/QHP: CPT

## 2024-05-10 RX ORDER — WARFARIN 2.5 MG/1
TABLET ORAL
Qty: 30 TABLET | Refills: 11 | Status: SHIPPED | OUTPATIENT
Start: 2024-05-10

## 2024-05-10 NOTE — PROGRESS NOTES
Patient identification verified with 2 identifiers.    Location: [facility] - [address] [place] [id number] [phone]     Referring Physician: Dr. Abi Gonsalves  Enrollment/ Re-enrollment date: 4/3/2025   INR Goal: 2.0-3.0  INR monitoring is per [facility] protocol.  Anticoagulation Medication: warfarin  Indication: Atrial Fibrillation/Atrial Flutter    Subjective   Bleeding signs/symptoms: No    Bruising: No   Major bleeding event: No  Thrombosis signs/symptoms: No  Thromboembolic event: No  Missed doses: No  Extra doses: No  Medication changes: No  Dietary changes: No  Change in health: No  Change in activity: No  Alcohol: No  Other concerns: No    Upcoming Procedures:  Does the Patient Have any upcoming procedures that require interruption in anticoagulation therapy? no  Does the patient require bridging? no      Anticoagulation Summary  As of 5/10/2024      INR goal:  2.0-3.0   TTR:  0.0% (1 wk)   INR used for dosing:  3.10 (5/10/2024)   Weekly warfarin total:  27.5 mg               Assessment/Plan   Supratherapeutic     1. New dose:  Requested new tablet strength of 2.5mg and reduced TWD.      2. Next INR: 1 week      Education provided to patient during the visit:  Patient instructed to call in interim with questions, concerns and changes.   Patient educated on interactions between medications and warfarin.   Patient educated on dietary consistency in vitamin k consumption.   Patient educated on affects of alcohol consumption while taking warfarin.   Patient educated on signs of bleeding/clotting.   Patient educated on compliance with dosing, follow up appointments, and prescribed plan of care.

## 2024-05-10 NOTE — TELEPHONE ENCOUNTER
Received message from Vazquez Puentes RN (coumadin clinic) requesting smaller mg tab of Warfarin (2.5 mg) since pt's INR is still at 3.1 after another 2 day hold.   He provided update on patient's pharmacy:  [facility] [address], [place], [place]. Store number is [id number].  The [facility] currently listed for him is closing.

## 2024-05-15 ENCOUNTER — OFFICE VISIT (OUTPATIENT)
Dept: OTOLARYNGOLOGY | Facility: CLINIC | Age: 74
End: 2024-05-15
Payer: MEDICARE

## 2024-05-15 VITALS
DIASTOLIC BLOOD PRESSURE: 77 MMHG | WEIGHT: 251 LBS | TEMPERATURE: 97.4 F | HEIGHT: 70 IN | BODY MASS INDEX: 35.93 KG/M2 | SYSTOLIC BLOOD PRESSURE: 131 MMHG

## 2024-05-15 DIAGNOSIS — J34.89 NASAL MUCOSA DRY: ICD-10-CM

## 2024-05-15 DIAGNOSIS — R04.0 EPISTAXIS: Primary | ICD-10-CM

## 2024-05-15 RX ORDER — OXYMETAZOLINE HYDROCHLORIDE 0.05 G/100ML
SPRAY, METERED NASAL
Qty: 30 ML | Refills: 0 | Status: SHIPPED | OUTPATIENT
Start: 2024-05-15

## 2024-05-15 NOTE — PROGRESS NOTES
Patient ID: Anderson Barlow is a 73 y.o. male who presents for the evaluation of epistaxis. They present as a referral from Dr. Vazquez Vu (PCP).    PROVIDER IMPRESSIONS:  DIAGNOSES/PROBLEMS:  -Epistaxis  -Dry nasal mucosa    ASSESSMENT:   Anderson Barlow is a pleasant 73 y.o. male who presents for the evaluation of epistaxis. Based on the clinical information provided, symptoms and clinical findings are consistent with recurrent epistaxis likely exacerbated by nasal mucosal dryness, digital manipulation, and current p.o. warfarin therapy. Nasal endoscopy was performed today and findings detailed in the procedure note below, which revealed  diffusely dry nasal mucosa  but otherwise normal. I informed patient that there was no sign of prominent blood vessel/capillary exposure in the nasal passageways as an identifiable source of epistaxis. There was no evidence of active bleeding, nasal polyps, masses, lesions, or purulence.  I discussed the findings of the patient and offered reassurance and counseling.     PLAN  Patient educated on home management for acute epistaxis episode: I recommended 2 puffs of oxymetazoline (Afrin) nasal spray into affected nostril as needed for acute nosebleed, then applying pressure by gently pinching the nostrils and continuously holding them together for 10 minutes. The patient was informed to seek emergency medical attention and go to the ED after the third unsuccessful attempt to control nosebleed with Afrin regimen. Patient informed that Afrin be purchased over-the-counter at a pharmacy. Per patient request, prescription for Afrin e-submitted to pharmacy (x 0 refills).  I counseled patient to avoid insertion of any object into the nasal passageways during a nosebleed as this may impede clot formation and irritate mucosal lining.  Lifestyle interventions for epistaxis prevention were reviewed with the patient and include the following: nasal hygiene, epistaxis precautions, and  aggressive nasal moisture regimen. Patient advised to refrain from heavy/hard nose blowing. Patient advised to sneeze with mouth open to reduce intranasal pressure. Patient advised to maintain adequate fluid hydration with recommended water daily intake of at least 64-72 oz. Patient advised not to avoid insertion of any foreign objects into the nasal cavity, such as fingers, tissues or Q-tips. I recommended the patient uses a cool-mist humidifier in the bedroom and living spaces to reduce dry air exposure.  I recommended patient starts to use nasal saline gel (I.e. Ayr nasal gel) at least 2-4 times per day. Patient may also use a saline nasal spray 4-6 times daily. Patient informed that these are indicated to reduce nasal dryness. Patient informed that these topical nasal regimen options may be purchased over-the-counter at a pharmacy.   I recommended patient starts to use non-medicated petroleum ointment (I.e. Vaseline or Aquaphor) every evening to reduce nasal dryness at nighttime. Patient instructed to place a pea-sized amount of ointment on the pad of the thumb, swipe into the nostril, sniff back and pinch the nose. Patient further educated to avoid use Q-tip or finger to rub inside of the nose. Patient informed that these topical regimen options may be purchased over-the-counter at a pharmacy.   Follow-up: Patient may schedule for follow-up in 4-6 weeks to evaluate symptom benefit and/or treatment outcomes, sooner as needed. Patient is agreeable to this plan, all questions were answered to patient's satisfaction. Note: Per patient preference, he requested follow-up at clinic site closer to home (in [place], [place]) and I recommended follow up visit with rhinology specialist Elizabeth Burch CNP. I provided patient contact information for the provider's office to schedule.    Subjective   HPI: Anderson Barlow is a 73 y.o. male who presents as a referral from PCP for initial evaluation of epistaxis or nosebleeds  which has been recurrent throughout his lifetime. Reports that nosebleed episodes typically occur in the right nostril and are more often at nighttime when sleeping.  Last nosebleed was approximately 1.5 weeks ago in the right nostril which lasted for 20 minutes. Since starting current p.o. warfarin regimen for A-fib, patient states he experiences nosebleed episodes 1 time per month which last each for approximately 20 minutes. Mentions he previously tried alternative blood-thinner therapy with p.o. Eliquis due to nosebleeds but was stopped due to worsened nosebleed frequency and duration. Reports that during acute nosebleed, he will twist a tissue and shove it into the nostril until bleeding resolves. Other interventions that patient attempted for nosebleeds include eating more raisins and purchase of 2 humidifiers for the bedroom. The patient has not been to the ER for the nosebleeds. The nose has not been cauterized in the past. Patient denies history of nasal surgery or trauma. Denies history of uncontrolled hypertension and takes daily blood pressure medication. Denies history of significant exposure to toxic fumes, nickel, or wood dust. Patient denies intranasal steroid use, repetitive digital trauma, or intranasal drug use. Patient denies any other sinonasal complaints. Patient states he was told by cardiologist that nosebleeds need to be controlled in order to be cleared to proceed with recommended cardiac ablation procedure.     PATIENT HISTORY:  Past Medical History:   Diagnosis Date    Aneurysm (CMS-HCC)     Atrial fibrillation (Multi)     Clotting disorder (Multi)     Hypertension       Past Surgical History:   Procedure Laterality Date    ARTERIAL ANEURYSM REPAIR      CATARACT EXTRACTION  06/23/2016    Cataract Surgery    COLONOSCOPY  06/23/2016    Complete Colonoscopy    CT ABDOMEN PELVIS ANGIOGRAM W AND/OR WO IV CONTRAST  11/04/2020    CT ABDOMEN PELVIS ANGIOGRAM W AND/OR WO IV CONTRAST [facility]  "[facility]    HEMORRHOID SURGERY  06/23/2016    Hemorrhoidectomy    OTHER SURGICAL HISTORY  03/28/2022    Abdominal aortic aneurysm repair      Allergies   Allergen Reactions    Codeine Unknown    Hydrocodone Other     MENTAL STATUS CHANGE    Hydrocodone-Acetaminophen Other     AMS    Lisinopril Other and Unknown     CHOKING    Oxycodone-Acetaminophen Unknown    Succinylcholine Other and Unknown     \"CAUSED FATHER'S PROSTATE CANCER\"    Cephalexin Diarrhea and Palpitations     VOMITING  HEART RACING  FACE FLUSHING    Penicillins Rash        Current Outpatient Medications:     acetaminophen (Tylenol Extra Strength) 500 mg tablet, Take 1-2 tablets (500-1,000 mg) by mouth 3 times a day as needed for mild pain (1 - 3), moderate pain (4 - 6) or fever (temp greater than 38.0 C)., Disp: , Rfl:     amiodarone (Pacerone) 200 mg tablet, Take 1 tablet (200 mg) by mouth 4 times a week AND 0.5 tablets (100 mg) 3 times a week., Disp: 66 tablet, Rfl: 3    cholecalciferol (Vitamin D3) 50 mcg (2,000 unit) capsule, Take 1 capsule (50 mcg) by mouth once daily., Disp: , Rfl:     ergocalciferol (Vitamin D-2) 1.25 MG (77521 UT) capsule, Take 1 capsule (50,000 Units) by mouth 1 (one) time per week., Disp: 12 capsule, Rfl: 0    ketoconazole (NIZOral) 2 % cream, Apply topically once daily as needed., Disp: , Rfl:     losartan (Cozaar) 50 mg tablet, Take 1 tablet (50 mg) by mouth once daily., Disp: 90 tablet, Rfl: 3    metoprolol tartrate (Lopressor) 50 mg tablet, Take 1 tablet (50 mg) by mouth 3 times a day., Disp: , Rfl:     pravastatin (Pravachol) 40 mg tablet, Take 1 tablet (40 mg) by mouth once daily at bedtime., Disp: 90 tablet, Rfl: 3    triamcinolone (Kenalog) 0.1 % cream, APPLY TWICE DAILY TO RASH UP TO 2 WEEKS/MONTH AS NEEDED., Disp: , Rfl:     warfarin (Coumadin) 2.5 mg tablet, Take as directed per Coumadin clinic After Visit Summary., Disp: 30 tablet, Rfl: 11    warfarin (Coumadin) 5 mg tablet, Take as directed by Coumadin clinic., " "Disp: 90 tablet, Rfl: 3   Tobacco Use: Medium Risk (5/3/2024)    Patient History     Smoking Tobacco Use: Former     Smokeless Tobacco Use: Never     Passive Exposure: Past      Alcohol Use: Not At Risk (4/9/2024)    AUDIT-C     Frequency of Alcohol Consumption: Never     Average Number of Drinks: Patient does not drink     Frequency of Binge Drinking: Never      Social History     Substance and Sexual Activity   Drug Use Never        Review of Systems   All other systems negative.     Objective   Visit Vitals  /77 (BP Location: Right arm, Patient Position: Sitting, BP Cuff Size: Adult)   Temp 36.3 °C (97.4 °F)   Ht 1.778 m (5' 10\")   Wt 114 kg (251 lb)   BMI 36.01 kg/m²   Smoking Status Former   BSA 2.37 m²        PHYSICAL EXAM:  General appearance: Appears well, well-nourished, well groomed. No acute distress.   Constitutional: No fever, chills, weight loss or weight gain.  Communication: Normal communication  Psychiatric: Oriented to person, place and time. Normal mood and affect.  Neurologic: Cranial nerves II-XII grossly intact and symmetric bilaterally.  Cardiovascular: Examination of peripheral vascular system shows no clubbing or cyanosis.  Respiratory: No respiratory distress increased work of breathing. Inspection of the chest with symmetric chest expansion and normal respiratory effort.  Skin: No head and neck rashes.  Head: Normocephalic. Atraumatic with no masses, lesions or scarring.  Face: Normal symmetry. No scars or deformities.  Eyes: Conjunctiva not edematous or erythematous. PERRLA  Neck: Supple and symmetric, trachea midline. Lymph nodes with no adenopathy.  Head: Normocephalic. Atraumatic with no masses, lesions or scarring.  Eyes: PERRL, EOMI, Conjunctiva is clear. No nystagmus.  Nose: External inspection of nose: No nasal lesions, lacerations or scars. No tenderness on frontal or maxillary sinus palpation.  Throat:  Floor of mouth is clear, no masses.  Tongue appears normal, no lesions " or masses. Gums, gingiva, buccal mucosa appear pink and moist, no lesions. Teeth are in intact.  No obvious dental infections.  Peritonsillar regions appear symmetric without swelling.  Hard and soft palate appear normal, no obvious cleft. Uvula is midline.  Left Tonsil -- 2+, no exudates, no erythema.   Right Tonsil -- 2+, no exudates, no erythema.   Oropharynx: No lesions. Retropharyngeal wall is flat.  No postnasal drip.  Right Ear: External inspection of ear with no deformity, scars, or masses. Mastoid is nontender. External auditory canal is clear. TM is intact with no sign of infection, effusion, or retraction. No perforation seen.   Left Ear: External inspection of ear with no deformity, scars, or masses. Mastoid is nontender. External auditory canal clear.  TM is intact with no sign of infection, effusion, or retraction. No perforation seen.     RECENT LAB RESULTS:  No results found for this or any previous visit (from the past 24 hour(s)).    IMAGING RESULTS:   None relevant to the current chief concern.    PROCEDURE NOTE: NASAL ENDOSCOPY-DIAGNOSTIC  Indication: concern for epistaxis  Procedure Note: For better visualization, flexible nasal endoscopy examination was performed.  Verbal consent was obtained by the patient and/or guardian. The risks, benefits, alternatives, and expectations were discussed with the patient, who wished to proceed. Both nostrils were sprayed with a mixture of lidocaine 4% and topical nasal decongestant oxymetazoline. After a sufficient amount of time elapsed for mucosal anesthesia to take place, the flexible fiberoptic nasopharyngoscope was advanced into the right naris, and then left naris. The following areas were visualized: Nasal passage, nasal septum, turbinates, middle meatus, nasopharynx, sinus ostia. At the end of the procedure, the nasopharyngeal scope removed without difficulty.   Outcomes: There were no complications and the patient tolerated the procedure  well.    Procedure Findings:  -The nasal septum was midline. No evidence of septal perforation or hematoma.  - Right: The inferior turbinate was healthy. The middle turbinate appeared healthy. The middle meatus and spheno-ethmoid recess were free of purulence, masses, lesions, or polyps. The nasal passageway is patent. The nasopharynx was clear. The nasal mucosal lining was dry, no evidence of bleeding, lesions, or ulceration.   - Left: The inferior turbinate was healthy. The middle turbinate appeared healthy. The middle meatus and spheno-ethmoid recess were free of purulence, masses, or polyps. The nasal passageway is patent. The nasopharynx was clear. The nasal mucosal lining was dry, no evidence of bleeding, lesions or ulceration.    Sarah Serra, APRN-CNP

## 2024-05-17 ENCOUNTER — ANTICOAGULATION - WARFARIN VISIT (OUTPATIENT)
Dept: CARDIOLOGY | Facility: CLINIC | Age: 74
End: 2024-05-17
Payer: MEDICARE

## 2024-05-17 DIAGNOSIS — I48.0 PAROXYSMAL ATRIAL FIBRILLATION (MULTI): Primary | ICD-10-CM

## 2024-05-17 LAB
POC INR: 2.3
POC PROTHROMBIN TIME: NORMAL

## 2024-05-17 PROCEDURE — 99211 OFF/OP EST MAY X REQ PHY/QHP: CPT

## 2024-05-17 NOTE — PROGRESS NOTES
Patient identification verified with 2 identifiers.    Location: [facility] - [address] [place], [place] [address] [phone]     Referring Physician: Dr. Abi Gonsalves  Enrollment/ Re-enrollment date: 4/3/2025   INR Goal: 2.0-3.0  INR monitoring is per [facility] protocol.  Anticoagulation Medication: warfarin  Indication: Atrial Fibrillation/Atrial Flutter    Subjective   Bleeding signs/symptoms: No    Bruising: No   Major bleeding event: No  Thrombosis signs/symptoms: No  Thromboembolic event: No  Missed doses: No  Extra doses: No  Medication changes: No  Dietary changes: No  Change in health: No  Change in activity: No  Alcohol: No  Other concerns: No    Upcoming Procedures:  Does the Patient Have any upcoming procedures that require interruption in anticoagulation therapy? no  Does the patient require bridging? no      Anticoagulation Summary  As of 2024      INR goal:  2.0-3.0   TTR:  43.8% (2 wk)   INR used for dosin.30 (2024)   Weekly warfarin total:  10 mg               Assessment/Plan   Therapeutic     1. New dose: no change    2. Next INR: 1 week      Education provided to patient during the visit:  Patient instructed to call in interim with questions, concerns and changes.   Patient educated on interactions between medications and warfarin.   Patient educated on dietary consistency in vitamin k consumption.   Patient educated on affects of alcohol consumption while taking warfarin.   Patient educated on signs of bleeding/clotting.   Patient educated on compliance with dosing, follow up appointments, and prescribed plan of care.

## 2024-05-24 ENCOUNTER — ANTICOAGULATION - WARFARIN VISIT (OUTPATIENT)
Dept: CARDIOLOGY | Facility: CLINIC | Age: 74
End: 2024-05-24
Payer: MEDICARE

## 2024-05-24 DIAGNOSIS — I48.0 PAROXYSMAL ATRIAL FIBRILLATION (MULTI): Primary | ICD-10-CM

## 2024-05-24 LAB
POC INR: 2.3
POC PROTHROMBIN TIME: NORMAL

## 2024-05-24 PROCEDURE — 99211 OFF/OP EST MAY X REQ PHY/QHP: CPT

## 2024-05-24 NOTE — PROGRESS NOTES
Patient identification verified with 2 identifiers.    Location: [facility] - [address] [place] [id number] [phone]     Referring Physician: Dr. Abi Gonsalves  Enrollment/ Re-enrollment date: 4/3/2025   INR Goal: 2.0-3.0  INR monitoring is per [facility] protocol.  Anticoagulation Medication: warfarin  Indication: Atrial Fibrillation/Atrial Flutter    Subjective   Bleeding signs/symptoms: No    Bruising: No   Major bleeding event: No  Thrombosis signs/symptoms: No  Thromboembolic event: No  Missed doses: No  Extra doses: No  Medication changes: No  Dietary changes: No  Change in health: No  Change in activity: No  Alcohol: No  Other concerns: No    Upcoming Procedures:  Does the Patient Have any upcoming procedures that require interruption in anticoagulation therapy? no  Does the patient require bridging? no      Anticoagulation Summary  As of 2024      INR goal:  2.0-3.0   TTR:  62.5% (3 wk)   INR used for dosin.30 (2024)   Weekly warfarin total:  8.75 mg               Assessment/Plan   Therapeutic     1. New dose: no change    2. Next INR: 1 week      Education provided to patient during the visit:  Patient instructed to call in interim with questions, concerns and changes.   Patient educated on interactions between medications and warfarin.   Patient educated on dietary consistency in vitamin k consumption.   Patient educated on affects of alcohol consumption while taking warfarin.   Patient educated on signs of bleeding/clotting.   Patient educated on compliance with dosing, follow up appointments, and prescribed plan of care.

## 2024-05-31 ENCOUNTER — ANTICOAGULATION - WARFARIN VISIT (OUTPATIENT)
Dept: CARDIOLOGY | Facility: CLINIC | Age: 74
End: 2024-05-31
Payer: MEDICARE

## 2024-05-31 DIAGNOSIS — I48.0 PAROXYSMAL ATRIAL FIBRILLATION (MULTI): Primary | ICD-10-CM

## 2024-05-31 LAB
POC INR: 1.4
POC PROTHROMBIN TIME: NORMAL

## 2024-05-31 PROCEDURE — 99211 OFF/OP EST MAY X REQ PHY/QHP: CPT

## 2024-05-31 NOTE — PROGRESS NOTES
Patient identification verified with 2 identifiers.    Location: [facility] - [address] [place] [id number] [phone]     Referring Physician: Dr. Abi Gonsalves  Enrollment/ Re-enrollment date: 4/3/2025   INR Goal: 2.0-3.0  INR monitoring is per [facility] protocol.  Anticoagulation Medication: warfarin  Indication: Atrial Fibrillation/Atrial Flutter    Subjective   Bleeding signs/symptoms: No    Bruising: No   Major bleeding event: No  Thrombosis signs/symptoms: No  Thromboembolic event: No  Missed doses: No  Extra doses: No  Medication changes: No  Dietary changes: Yes  Pt did have coleslaw this past week.  Change in health: No  Change in activity: No  Alcohol: No  Other concerns: No    Upcoming Procedures:  Does the Patient Have any upcoming procedures that require interruption in anticoagulation therapy? no  Does the patient require bridging? no      Anticoagulation Summary  As of 2024      INR goal:  2.0-3.0   TTR:  55.2% (4 wk)   INR used for dosin.40 (2024)   Weekly warfarin total:  11.25 mg               Assessment/Plan   Subtherapeutic     1. New dose:  Increase TWD 10%.     2. Next INR: 1 week      Education provided to patient during the visit:  Patient instructed to call in interim with questions, concerns and changes.   Patient educated on interactions between medications and warfarin.   Patient educated on dietary consistency in vitamin k consumption.   Patient educated on affects of alcohol consumption while taking warfarin.   Patient educated on signs of bleeding/clotting.   Patient educated on compliance with dosing, follow up appointments, and prescribed plan of care.

## 2024-06-12 ENCOUNTER — ANTICOAGULATION - WARFARIN VISIT (OUTPATIENT)
Dept: CARDIOLOGY | Facility: CLINIC | Age: 74
End: 2024-06-12
Payer: MEDICARE

## 2024-06-12 DIAGNOSIS — I48.0 PAROXYSMAL ATRIAL FIBRILLATION (MULTI): Primary | ICD-10-CM

## 2024-06-12 LAB
POC INR: 2.1
POC PROTHROMBIN TIME: NORMAL

## 2024-06-12 PROCEDURE — 99211 OFF/OP EST MAY X REQ PHY/QHP: CPT

## 2024-06-12 NOTE — PROGRESS NOTES
Patient identification verified with 2 identifiers.    Location: [facility] - [address] [place] [id number] [phone]     Referring Physician: Dr. Abi Gonsalves  Enrollment/ Re-enrollment date: 4/3/2025   INR Goal: 2.0-3.0  INR monitoring is per [facility] protocol.  Anticoagulation Medication: warfarin  Indication: Atrial Fibrillation/Atrial Flutter    Subjective   Bleeding signs/symptoms: No    Bruising: No   Major bleeding event: No  Thrombosis signs/symptoms: No  Thromboembolic event: No  Missed doses: No  Extra doses: No  Medication changes: No  Dietary changes: No  Change in health: No  Change in activity: No  Alcohol: No  Other concerns: No    Upcoming Procedures:  Does the Patient Have any upcoming procedures that require interruption in anticoagulation therapy? no  Does the patient require bridging? no      Anticoagulation Summary  As of 2024      INR goal:  2.0-3.0   TTR:  42.9% (1.3 mo)   INR used for dosin.10 (2024)   Weekly warfarin total:  11.25 mg               Assessment/Plan   Therapeutic     1. New dose: no change    2. Next INR: 1 week      Education provided to patient during the visit:  Patient instructed to call in interim with questions, concerns and changes.   Patient educated on interactions between medications and warfarin.   Patient educated on dietary consistency in vitamin k consumption.   Patient educated on affects of alcohol consumption while taking warfarin.   Patient educated on signs of bleeding/clotting.   Patient educated on compliance with dosing, follow up appointments, and prescribed plan of care.

## 2024-06-19 ENCOUNTER — ANTICOAGULATION - WARFARIN VISIT (OUTPATIENT)
Dept: CARDIOLOGY | Facility: CLINIC | Age: 74
End: 2024-06-19
Payer: MEDICARE

## 2024-06-19 DIAGNOSIS — I48.0 PAROXYSMAL ATRIAL FIBRILLATION (MULTI): Primary | ICD-10-CM

## 2024-06-19 LAB
POC INR: 1.5
POC PROTHROMBIN TIME: NORMAL

## 2024-06-19 PROCEDURE — 85610 PROTHROMBIN TIME: CPT | Mod: QW

## 2024-06-19 PROCEDURE — 99211 OFF/OP EST MAY X REQ PHY/QHP: CPT

## 2024-06-19 NOTE — PROGRESS NOTES
Patient identification verified with 2 identifiers.    Location: [facility] - [address] [place] [address] [phone]     Referring Physician: Dr.Judy Gonsalves  Enrollment/ Re-enrollment date: 4/3/2025   INR Goal: 2.0-3.0  INR monitoring is per [facility] protocol.  Anticoagulation Medication: warfarin  Indication: Atrial Fibrillation/Atrial Flutter    Subjective   Bleeding signs/symptoms: No    Bruising: No   Major bleeding event: No  Thrombosis signs/symptoms: No  Thromboembolic event: No  Missed doses: No  Extra doses: No  Medication changes: No  Dietary changes: No  Change in health: No  Change in activity: No  Alcohol: No  Other concerns: No    Upcoming Procedures:  Does the Patient Have any upcoming procedures that require interruption in anticoagulation therapy? no  Does the patient require bridging? no      Anticoagulation Summary  As of 6/19/2024      INR goal:  2.0-3.0   TTR:  42.9% (1.3 mo)   INR used for dosing:                 Assessment/Plan   Subtherapeutic     1. New dose:  Increase TWD 10%.     2. Next INR: 1 week      Education provided to patient during the visit:  Patient instructed to call in interim with questions, concerns and changes.   Patient educated on interactions between medications and warfarin.   Patient educated on dietary consistency in vitamin k consumption.   Patient educated on affects of alcohol consumption while taking warfarin.   Patient educated on signs of bleeding/clotting.   Patient educated on compliance with dosing, follow up appointments, and prescribed plan of care.

## 2024-06-20 ENCOUNTER — APPOINTMENT (OUTPATIENT)
Dept: CARDIOLOGY | Facility: CLINIC | Age: 74
End: 2024-06-20
Payer: MEDICARE

## 2024-06-24 ENCOUNTER — ANTICOAGULATION - WARFARIN VISIT (OUTPATIENT)
Dept: CARDIOLOGY | Facility: CLINIC | Age: 74
End: 2024-06-24
Payer: MEDICARE

## 2024-06-24 DIAGNOSIS — I48.0 PAROXYSMAL ATRIAL FIBRILLATION (MULTI): Primary | ICD-10-CM

## 2024-06-24 LAB
POC INR: 1.6
POC PROTHROMBIN TIME: NORMAL

## 2024-06-24 PROCEDURE — 99211 OFF/OP EST MAY X REQ PHY/QHP: CPT

## 2024-06-24 PROCEDURE — 85610 PROTHROMBIN TIME: CPT | Mod: QW

## 2024-06-24 NOTE — PROGRESS NOTES
Patient identification verified with 2 identifiers.    Location: [facility] - [address] [place] [id number] [phone]     Referring Physician: Dr. Abi Gonsalves  Enrollment/ Re-enrollment date: 4/3/2025   INR Goal: 2.0-3.0  INR monitoring is per [facility] protocol.  Anticoagulation Medication: warfarin  Indication: Atrial Fibrillation/Atrial Flutter    Subjective   Bleeding signs/symptoms: No    Bruising: No   Major bleeding event: No  Thrombosis signs/symptoms: No  Thromboembolic event: No  Missed doses: No  Extra doses: No  Medication changes: No  Dietary changes: No  Change in health: No  Change in activity: No  Alcohol: No  Other concerns: No    Upcoming Procedures:  Does the Patient Have any upcoming procedures that require interruption in anticoagulation therapy? no  Does the patient require bridging? no      Anticoagulation Summary  As of 2024      INR goal:  2.0-3.0   TTR:  35.3% (1.7 mo)   INR used for dosin.60 (2024)   Weekly warfarin total:  16.25 mg               Assessment/Plan   Subtherapeutic     1. New dose:  Increase weekly dose 20%.     2. Next INR: 1 week      Education provided to patient during the visit:  Patient instructed to call in interim with questions, concerns and changes.   Patient educated on interactions between medications and warfarin.   Patient educated on dietary consistency in vitamin k consumption.   Patient educated on affects of alcohol consumption while taking warfarin.   Patient educated on signs of bleeding/clotting.   Patient educated on compliance with dosing, follow up appointments, and prescribed plan of care.

## 2024-07-01 ENCOUNTER — ANTICOAGULATION - WARFARIN VISIT (OUTPATIENT)
Dept: CARDIOLOGY | Facility: CLINIC | Age: 74
End: 2024-07-01
Payer: MEDICARE

## 2024-07-01 DIAGNOSIS — I48.0 PAROXYSMAL ATRIAL FIBRILLATION (MULTI): Primary | ICD-10-CM

## 2024-07-01 LAB
POC INR: 1.8
POC PROTHROMBIN TIME: NORMAL

## 2024-07-01 PROCEDURE — 85610 PROTHROMBIN TIME: CPT | Mod: QW

## 2024-07-01 PROCEDURE — 99211 OFF/OP EST MAY X REQ PHY/QHP: CPT

## 2024-07-01 NOTE — PROGRESS NOTES
Patient identification verified with 2 identifiers.    Location: [facility] - [address] [place] [address] [phone]     Referring Physician: Dr. Abi Gonsalves  Enrollment/ Re-enrollment date: 4/3/2025   INR Goal: 2.0-3.0  INR monitoring is per [facility] protocol.  Anticoagulation Medication: warfarin  Indication: Atrial Fibrillation/Atrial Flutter    Subjective   Bleeding signs/symptoms: No    Bruising: No   Major bleeding event: No  Thrombosis signs/symptoms: No  Thromboembolic event: No  Missed doses: No  Extra doses: No  Medication changes: No  Dietary changes: No  Change in health: No  Change in activity: No  Alcohol: No  Other concerns: No    Upcoming Procedures:  Does the Patient Have any upcoming procedures that require interruption in anticoagulation therapy? no  Does the patient require bridging? no      Anticoagulation Summary  As of 2024      INR goal:  2.0-3.0   TTR:  31.1% (2 mo)   INR used for dosin.80 (2024)   Weekly warfarin total:  16.25 mg               Assessment/Plan   Subtherapeutic     1. New dose:  Increase weekly dose      2. Next INR: 1 week      Education provided to patient during the visit:  Patient instructed to call in interim with questions, concerns and changes.   Patient educated on interactions between medications and warfarin.   Patient educated on dietary consistency in vitamin k consumption.   Patient educated on affects of alcohol consumption while taking warfarin.   Patient educated on signs of bleeding/clotting.   Patient educated on compliance with dosing, follow up appointments, and prescribed plan of care.

## 2024-07-08 ENCOUNTER — ANTICOAGULATION - WARFARIN VISIT (OUTPATIENT)
Dept: CARDIOLOGY | Facility: CLINIC | Age: 74
End: 2024-07-08
Payer: MEDICARE

## 2024-07-08 DIAGNOSIS — I48.0 PAROXYSMAL ATRIAL FIBRILLATION (MULTI): Primary | ICD-10-CM

## 2024-07-08 LAB
POC INR: 2.8
POC PROTHROMBIN TIME: NORMAL

## 2024-07-08 PROCEDURE — 85610 PROTHROMBIN TIME: CPT | Mod: QW

## 2024-07-08 PROCEDURE — 99211 OFF/OP EST MAY X REQ PHY/QHP: CPT

## 2024-07-08 NOTE — PROGRESS NOTES
Patient identification verified with 2 identifiers.    Location: [facility] - [address] [place] [id number] [phone]     Referring Physician: Dr. Abi Gonsalves  Enrollment/ Re-enrollment date: 4/3/2025   INR Goal: 2.0-3.0  INR monitoring is per [facility] protocol.  Anticoagulation Medication: warfarin  Indication: Atrial Fibrillation/Atrial Flutter    Subjective   Bleeding signs/symptoms: No    Bruising: No   Major bleeding event: No  Thrombosis signs/symptoms: No  Thromboembolic event: No  Missed doses: No  Extra doses: No  Medication changes: No  Dietary changes: No  Change in health: No  Change in activity: No  Alcohol: No  Other concerns: No    Upcoming Procedures:  Does the Patient Have any upcoming procedures that require interruption in anticoagulation therapy? no  Does the patient require bridging? no      Anticoagulation Summary  As of 2024      INR goal:  2.0-3.0   TTR:  36.3% (2.2 mo)   INR used for dosin.80 (2024)   Weekly warfarin total:  18.75 mg               Assessment/Plan   Therapeutic     1. New dose: no change    2. Next INR: 1 week      Education provided to patient during the visit:  Patient instructed to call in interim with questions, concerns and changes.   Patient educated on interactions between medications and warfarin.   Patient educated on dietary consistency in vitamin k consumption.   Patient educated on affects of alcohol consumption while taking warfarin.   Patient educated on signs of bleeding/clotting.   Patient educated on compliance with dosing, follow up appointments, and prescribed plan of care.

## 2024-07-15 ENCOUNTER — ANTICOAGULATION - WARFARIN VISIT (OUTPATIENT)
Dept: CARDIOLOGY | Facility: CLINIC | Age: 74
End: 2024-07-15
Payer: MEDICARE

## 2024-07-15 ENCOUNTER — OFFICE VISIT (OUTPATIENT)
Dept: CARDIOLOGY | Facility: CLINIC | Age: 74
End: 2024-07-15
Payer: MEDICARE

## 2024-07-15 VITALS
SYSTOLIC BLOOD PRESSURE: 130 MMHG | WEIGHT: 248 LBS | DIASTOLIC BLOOD PRESSURE: 74 MMHG | BODY MASS INDEX: 35.58 KG/M2 | OXYGEN SATURATION: 98 % | HEART RATE: 69 BPM

## 2024-07-15 DIAGNOSIS — I10 PRIMARY HYPERTENSION: ICD-10-CM

## 2024-07-15 DIAGNOSIS — I35.0 AORTIC STENOSIS, MILD: Primary | ICD-10-CM

## 2024-07-15 DIAGNOSIS — I48.0 PAROXYSMAL ATRIAL FIBRILLATION (MULTI): ICD-10-CM

## 2024-07-15 DIAGNOSIS — E78.2 MIXED HYPERLIPIDEMIA: ICD-10-CM

## 2024-07-15 DIAGNOSIS — I48.0 PAROXYSMAL ATRIAL FIBRILLATION (MULTI): Primary | ICD-10-CM

## 2024-07-15 DIAGNOSIS — Z98.890 HISTORY OF AAA (ABDOMINAL AORTIC ANEURYSM) REPAIR: ICD-10-CM

## 2024-07-15 LAB
POC INR: 2.9
POC PROTHROMBIN TIME: NORMAL

## 2024-07-15 PROCEDURE — 1157F ADVNC CARE PLAN IN RCRD: CPT | Performed by: INTERNAL MEDICINE

## 2024-07-15 PROCEDURE — 1036F TOBACCO NON-USER: CPT | Performed by: INTERNAL MEDICINE

## 2024-07-15 PROCEDURE — 1159F MED LIST DOCD IN RCRD: CPT | Performed by: INTERNAL MEDICINE

## 2024-07-15 PROCEDURE — 3078F DIAST BP <80 MM HG: CPT | Performed by: INTERNAL MEDICINE

## 2024-07-15 PROCEDURE — 99211 OFF/OP EST MAY X REQ PHY/QHP: CPT

## 2024-07-15 PROCEDURE — 99214 OFFICE O/P EST MOD 30 MIN: CPT | Performed by: INTERNAL MEDICINE

## 2024-07-15 PROCEDURE — 3075F SYST BP GE 130 - 139MM HG: CPT | Performed by: INTERNAL MEDICINE

## 2024-07-15 PROCEDURE — 85610 PROTHROMBIN TIME: CPT | Mod: QW

## 2024-07-15 RX ORDER — ATORVASTATIN CALCIUM 80 MG/1
80 TABLET, FILM COATED ORAL DAILY
Qty: 30 TABLET | Refills: 11 | Status: SHIPPED | OUTPATIENT
Start: 2024-07-15 | End: 2025-07-15

## 2024-07-15 ASSESSMENT — ENCOUNTER SYMPTOMS
PND: 0
OCCASIONAL FEELINGS OF UNSTEADINESS: 0
MYALGIAS: 0
LOSS OF SENSATION IN FEET: 0
NEAR-SYNCOPE: 0
IRREGULAR HEARTBEAT: 0
SHORTNESS OF BREATH: 0
WEIGHT LOSS: 0
PALPITATIONS: 0
FEVER: 0
ORTHOPNEA: 0
WEAKNESS: 0
CLAUDICATION: 0
DIAPHORESIS: 0
DYSPNEA ON EXERTION: 0
COUGH: 0
SYNCOPE: 0
DEPRESSION: 0
WHEEZING: 0
WEIGHT GAIN: 0
DIZZINESS: 0

## 2024-07-15 ASSESSMENT — PATIENT HEALTH QUESTIONNAIRE - PHQ9
2. FEELING DOWN, DEPRESSED OR HOPELESS: NOT AT ALL
1. LITTLE INTEREST OR PLEASURE IN DOING THINGS: NOT AT ALL
SUM OF ALL RESPONSES TO PHQ9 QUESTIONS 1 AND 2: 0

## 2024-07-15 NOTE — PROGRESS NOTES
Patient identification verified with 2 identifiers.    Location: [facility] - [address] [place] [id number] [phone]     Referring Physician: Dr. Abi Gonsalves  Enrollment/ Re-enrollment date: 4/3/2025   INR Goal: 2.0-3.0  INR monitoring is per [facility] protocol.  Anticoagulation Medication: warfarin  Indication: Atrial Fibrillation/Atrial Flutter    Subjective   Bleeding signs/symptoms: No    Bruising: No   Major bleeding event: No  Thrombosis signs/symptoms: No  Thromboembolic event: No  Missed doses: No  Extra doses: No  Medication changes: No  Dietary changes: No  Change in health: No  Change in activity: No  Alcohol: No  Other concerns: No    Upcoming Procedures:  Does the Patient Have any upcoming procedures that require interruption in anticoagulation therapy? no  Does the patient require bridging? no      Anticoagulation Summary  As of 7/15/2024      INR goal:  2.0-3.0   TTR:  42.8% (2.4 mo)   INR used for dosin.90 (7/15/2024)   Weekly warfarin total:  18.75 mg               Assessment/Plan   Therapeutic     1. New dose: no change    2. Next INR: 2 weeks      Education provided to patient during the visit:  Patient instructed to call in interim with questions, concerns and changes.   Patient educated on interactions between medications and warfarin.   Patient educated on dietary consistency in vitamin k consumption.   Patient educated on affects of alcohol consumption while taking warfarin.   Patient educated on signs of bleeding/clotting.   Patient educated on compliance with dosing, follow up appointments, and prescribed plan of care.

## 2024-07-15 NOTE — PROGRESS NOTES
Subjective      Chief Complaint   Patient presents with    Follow-up        73-year-old male with history of paroxysmal atrial fibrillation on Rythmol, beta-blocker, and oral anticoagulation.  He has an abdominal aortic aneurysm that is monitored by [facility].  He is here for 6-month follow-up. He takes Eliquis 1x/day 2/2 frequent nosebleeds.  When I saw him last in March we had discussed the Watchman device, referred him to Dr. Colón.  They discussed the procedure and he seemed amenable.  In the interim he saw his electrophysiologist Dr. Gonsalves who had suggested RFA ablation with left atrial appendage clipping simultaneously.  In the meantime he was placed back on Coumadin due to his issues with twice daily Eliquis.  This is being monitored by the Coumadin clinic.  Regarding his peripheral vascular disease and history of juxtarenal abdominal aortic aneurysm surgical repair he was seen by Dr. Mares who identified the possibility of a left renal artery dissection.  He is to undergo angiography for this with possible percutaneous intervention, scheduled for August 7th         Review of Systems   Constitutional: Negative for diaphoresis, fever, weight gain and weight loss.   Eyes:  Negative for visual disturbance.   Cardiovascular:  Negative for chest pain, claudication, dyspnea on exertion, irregular heartbeat, leg swelling, near-syncope, orthopnea, palpitations, paroxysmal nocturnal dyspnea and syncope.   Respiratory:  Negative for cough, shortness of breath and wheezing.    Musculoskeletal:  Negative for muscle weakness and myalgias.   Neurological:  Negative for dizziness and weakness.   All other systems reviewed and are negative.       Past Medical History:   Diagnosis Date    Aneurysm (CMS-HCC)     Atrial fibrillation (Multi)     Clotting disorder (Multi)     Hypertension         Past Surgical History:   Procedure Laterality Date    ARTERIAL ANEURYSM REPAIR      CATARACT EXTRACTION   06/23/2016    Cataract Surgery    COLONOSCOPY  06/23/2016    Complete Colonoscopy    CT ABDOMEN PELVIS ANGIOGRAM W AND/OR WO IV CONTRAST  11/04/2020    CT ABDOMEN PELVIS ANGIOGRAM W AND/OR WO IV CONTRAST [facility]    HEMORRHOID SURGERY  06/23/2016    Hemorrhoidectomy    OTHER SURGICAL HISTORY  03/28/2022    Abdominal aortic aneurysm repair        Social History     Socioeconomic History    Marital status:      Spouse name: Not on file    Number of children: Not on file    Years of education: Not on file    Highest education level: Not on file   Occupational History    Not on file   Tobacco Use    Smoking status: Former     Types: Cigarettes     Passive exposure: Past    Smokeless tobacco: Never    Tobacco comments:     Pt quit about 10 years ago   Vaping Use    Vaping status: Never Used   Substance and Sexual Activity    Alcohol use: Not Currently    Drug use: Never    Sexual activity: Defer     Partners: Female   Other Topics Concern    Not on file   Social History Narrative    Not on file     Social Determinants of Health     Financial Resource Strain: Not on file   Food Insecurity: Not on file   Transportation Needs: Not on file   Physical Activity: Not on file   Stress: Not on file   Social Connections: Not on file   Intimate Partner Violence: Not on file   Housing Stability: Not on file        Family History   Problem Relation Name Age of Onset    Alzheimer's disease Mother      Liver cancer Other SIBLINGS         OBJECTIVE:    Vitals:    07/15/24 0856   BP: 130/74   Pulse: 69   SpO2: 98%        Vitals reviewed.   Constitutional:       Appearance: Normal and healthy appearance. Not in distress.   Pulmonary:      Effort: Pulmonary effort is normal.      Breath sounds: Normal breath sounds.   Cardiovascular:      Normal rate. Regular rhythm. Normal S1. Normal S2.       Murmurs: There is no murmur.      No gallop.  No click.   Pulses:     Intact distal pulses.   Edema:     Peripheral edema  absent.   Skin:     General: Skin is warm and dry.   Neurological:      General: No focal deficit present.          Lab Review:   Lab Results   Component Value Date     04/09/2024    K 4.6 04/09/2024     04/09/2024    CO2 26 04/09/2024    BUN 14 04/09/2024    CREATININE 1.30 04/09/2024    GLUCOSE 95 04/09/2024    CALCIUM 9.3 04/09/2024     Lab Results   Component Value Date    CHOL 201 (H) 04/09/2024    TRIG 134 04/09/2024    HDL 41.0 04/09/2024       Lab Results   Component Value Date    LDLCALC 133 (H) 04/09/2024        Aortic stenosis, mild  Mild progressive stage B Echo 7/2023    Atrial fibrillation (Multi)  Paroxysmal, on coumadin for AC with plans for RFA and DIMITRI clip    History of AAA (abdominal aortic aneurysm) repair  ? Renal artery dissection, for Angio with possible stent with Dr Mares 8/7    HTN (hypertension)  controlled    HLD (hyperlipidemia)  Prava to lipitor to achieve LDL<70. Lipids in the fall w PCP

## 2024-07-29 ENCOUNTER — ANTICOAGULATION - WARFARIN VISIT (OUTPATIENT)
Dept: CARDIOLOGY | Facility: CLINIC | Age: 74
End: 2024-07-29
Payer: MEDICARE

## 2024-07-29 DIAGNOSIS — I48.0 PAROXYSMAL ATRIAL FIBRILLATION (MULTI): Primary | ICD-10-CM

## 2024-07-29 LAB
POC INR: 3
POC PROTHROMBIN TIME: NORMAL

## 2024-07-29 PROCEDURE — 85610 PROTHROMBIN TIME: CPT | Mod: QW

## 2024-07-29 PROCEDURE — 99211 OFF/OP EST MAY X REQ PHY/QHP: CPT

## 2024-07-29 NOTE — PROGRESS NOTES
Patient identification verified with 2 identifiers.    Location: [facility] - [address] [place] [id number] [phone]     Referring Physician: Dr. Abi Gonsalves  Enrollment/ Re-enrollment date: 4/3/2025   INR Goal: 2.0-3.0  INR monitoring is per [facility] protocol.  Anticoagulation Medication: warfarin  Indication: Atrial Fibrillation/Atrial Flutter    Subjective   Bleeding signs/symptoms: No    Bruising: No   Major bleeding event: No  Thrombosis signs/symptoms: No  Thromboembolic event: No  Missed doses: No  Extra doses: No  Medication changes: No  Dietary changes: No  Change in health: No  Change in activity: No  Alcohol: No  Other concerns: No    Upcoming Procedures:  Does the Patient Have any upcoming procedures that require interruption in anticoagulation therapy? no  Does the patient require bridging? no      Anticoagulation Summary  As of 7/29/2024      INR goal:  2.0-3.0   TTR:  51.7% (2.9 mo)   INR used for dosing:  3.00 (7/29/2024)   Weekly warfarin total:  18.75 mg               Assessment/Plan   Therapeutic     1. New dose: no change    2. Next INR: 3 weeks due to scheduled procedure on 8/13/25.      Education provided to patient during the visit:  Patient instructed to call in interim with questions, concerns and changes.   Patient educated on interactions between medications and warfarin.   Patient educated on dietary consistency in vitamin k consumption.   Patient educated on affects of alcohol consumption while taking warfarin.   Patient educated on signs of bleeding/clotting.   Patient educated on compliance with dosing, follow up appointments, and prescribed plan of care.

## 2024-08-10 DIAGNOSIS — I48.0 PAROXYSMAL ATRIAL FIBRILLATION (MULTI): ICD-10-CM

## 2024-08-10 DIAGNOSIS — Z79.899 LONG TERM CURRENT USE OF AMIODARONE: ICD-10-CM

## 2024-08-12 RX ORDER — AMIODARONE HYDROCHLORIDE 200 MG/1
TABLET ORAL
Qty: 198 TABLET | Refills: 1 | Status: SHIPPED | OUTPATIENT
Start: 2024-08-12 | End: 2025-08-12

## 2024-08-16 ENCOUNTER — TELEPHONE (OUTPATIENT)
Dept: CARDIOLOGY | Facility: CLINIC | Age: 74
End: 2024-08-16
Payer: MEDICARE

## 2024-08-16 NOTE — TELEPHONE ENCOUNTER
Called pt to reschedule canceled anticoag monitoring appointment.  Pt's angiogram was canceled on 8/13 and rescheduled for 8/21.  Pt is following warfarin instruction from surgeon at this time.  Anticoag appointment rescheduled for 8/26.

## 2024-08-19 ENCOUNTER — APPOINTMENT (OUTPATIENT)
Dept: CARDIOLOGY | Facility: CLINIC | Age: 74
End: 2024-08-19
Payer: MEDICARE

## 2024-08-26 ENCOUNTER — APPOINTMENT (OUTPATIENT)
Dept: CARDIOLOGY | Facility: CLINIC | Age: 74
End: 2024-08-26
Payer: MEDICARE

## 2024-09-09 ENCOUNTER — ANTICOAGULATION - WARFARIN VISIT (OUTPATIENT)
Dept: CARDIOLOGY | Facility: CLINIC | Age: 74
End: 2024-09-09
Payer: MEDICARE

## 2024-09-09 DIAGNOSIS — I48.0 PAROXYSMAL ATRIAL FIBRILLATION (MULTI): Primary | ICD-10-CM

## 2024-09-09 LAB
POC INR: 1.7
POC PROTHROMBIN TIME: NORMAL

## 2024-09-09 PROCEDURE — 99211 OFF/OP EST MAY X REQ PHY/QHP: CPT

## 2024-09-09 PROCEDURE — 85610 PROTHROMBIN TIME: CPT | Mod: QW

## 2024-09-09 NOTE — PROGRESS NOTES
Patient identification verified with 2 identifiers.    Location: [facility] - [address] [place], [place] [address] [phone]     Referring Physician: Dr. Abi Gonsalves  Enrollment/ Re-enrollment date: 4/3/2025   INR Goal: 2.0-3.0  INR monitoring is per [facility] protocol.  Anticoagulation Medication: warfarin  Indication: Atrial Fibrillation/Atrial Flutter    Subjective   Bleeding signs/symptoms: No    Bruising: No   Major bleeding event: No  Thrombosis signs/symptoms: No  Thromboembolic event: No  Missed doses: Yes  Pt held Warfarin for 4 days prior to procedure on .  Extra doses: No  Medication changes: No  Dietary changes: No  Change in health: No  Change in activity: No  Alcohol: No  Other concerns: No    Upcoming Procedures:  Does the Patient Have any upcoming procedures that require interruption in anticoagulation therapy? no  Does the patient require bridging? no      Anticoagulation Summary  As of 2024      INR goal:  2.0-3.0   TTR:  59.9% (4.3 mo)   INR used for dosin.70 (2024)   Weekly warfarin total:  18.75 mg               Assessment/Plan   Subtherapeutic     1. New dose: no change    2. Next INR: 1 week      Education provided to patient during the visit:  Patient instructed to call in interim with questions, concerns and changes.   Patient educated on interactions between medications and warfarin.   Patient educated on dietary consistency in vitamin k consumption.   Patient educated on affects of alcohol consumption while taking warfarin.   Patient educated on signs of bleeding/clotting.   Patient educated on compliance with dosing, follow up appointments, and prescribed plan of care.

## 2024-09-16 ENCOUNTER — ANTICOAGULATION - WARFARIN VISIT (OUTPATIENT)
Dept: CARDIOLOGY | Facility: CLINIC | Age: 74
End: 2024-09-16
Payer: MEDICARE

## 2024-09-16 DIAGNOSIS — I48.0 PAROXYSMAL ATRIAL FIBRILLATION (MULTI): Primary | ICD-10-CM

## 2024-09-16 LAB
POC INR: 3.1
POC PROTHROMBIN TIME: NORMAL

## 2024-09-16 PROCEDURE — 85610 PROTHROMBIN TIME: CPT | Mod: QW

## 2024-09-16 PROCEDURE — 99211 OFF/OP EST MAY X REQ PHY/QHP: CPT

## 2024-09-16 NOTE — PROGRESS NOTES
Patient identification verified with 2 identifiers.    Location: [facility] - [address] [place] [id number] [phone]     Referring Physician: Dr. Abi Gonsalves  Enrollment/ Re-enrollment date: 4/3/2025   INR Goal: 2.0-3.0  INR monitoring is per [facility] protocol.  Anticoagulation Medication: warfarin  Indication: Atrial Fibrillation/Atrial Flutter    Subjective   Bleeding signs/symptoms: No    Bruising: No   Major bleeding event: No  Thrombosis signs/symptoms: No  Thromboembolic event: No  Missed doses: No  Extra doses: No  Medication changes: No  Dietary changes: No  Change in health: No  Change in activity: No  Alcohol: No  Other concerns: No    Upcoming Procedures:  Does the Patient Have any upcoming procedures that require interruption in anticoagulation therapy? no  Does the patient require bridging? no      Anticoagulation Summary  As of 9/16/2024      INR goal:  2.0-3.0   TTR:  60.5% (4.5 mo)   INR used for dosing:  3.10 (9/16/2024)   Weekly warfarin total:  18.75 mg               Assessment/Plan   Supratherapeutic     1. New dose:  Will decrease TWD.      2. Next INR: 1 week      Education provided to patient during the visit:  Patient instructed to call in interim with questions, concerns and changes.   Patient educated on interactions between medications and warfarin.   Patient educated on dietary consistency in vitamin k consumption.   Patient educated on affects of alcohol consumption while taking warfarin.   Patient educated on signs of bleeding/clotting.   Patient educated on compliance with dosing, follow up appointments, and prescribed plan of care.

## 2024-09-23 ENCOUNTER — ANTICOAGULATION - WARFARIN VISIT (OUTPATIENT)
Dept: CARDIOLOGY | Facility: CLINIC | Age: 74
End: 2024-09-23
Payer: MEDICARE

## 2024-09-23 ENCOUNTER — TELEPHONE (OUTPATIENT)
Dept: CARDIOLOGY | Facility: CLINIC | Age: 74
End: 2024-09-23
Payer: MEDICARE

## 2024-09-23 DIAGNOSIS — I48.91 ATRIAL FIBRILLATION, UNSPECIFIED TYPE (MULTI): Primary | ICD-10-CM

## 2024-09-23 LAB
POC INR: 3.3 (ref 2–3)
POC PROTHROMBIN TIME: ABNORMAL

## 2024-09-23 PROCEDURE — 99211 OFF/OP EST MAY X REQ PHY/QHP: CPT

## 2024-09-23 PROCEDURE — 85610 PROTHROMBIN TIME: CPT | Mod: QW

## 2024-09-23 NOTE — PROGRESS NOTES
Patient identification verified with 2 identifiers.    Location: [facility] - [address] [place] [id number] [phone]     Referring Physician: Dr. Abi Gonsalves  Enrollment/ Re-enrollment date: 4/3/2025   INR Goal: 2.0-3.0  INR monitoring is per [facility] protocol.  Anticoagulation Medication: warfarin  Indication: Atrial Fibrillation/Atrial Flutter    Subjective   Bleeding signs/symptoms: No    Bruising: No   Major bleeding event: No  Thrombosis signs/symptoms: No  Thromboembolic event: No  Missed doses: No  Extra doses: No  Medication changes: No  Dietary changes: No  Change in health: No  Change in activity: No  Alcohol: No  Other concerns: No    Upcoming Procedures:  Does the Patient Have any upcoming procedures that require interruption in anticoagulation therapy? no  Does the patient require bridging? no      Anticoagulation Summary  As of 9/23/2024      INR goal:  2.0-3.0   TTR:  57.5% (4.8 mo)   INR used for dosing:  --               Assessment/Plan   Supratherapeutic     1. New dose:  Will decrease TWD.      2. Next INR: 1 week      Education provided to patient during the visit:  Patient instructed to call in interim with questions, concerns and changes.   Patient educated on interactions between medications and warfarin.   Patient educated on dietary consistency in vitamin k consumption.   Patient educated on affects of alcohol consumption while taking warfarin.   Patient educated on signs of bleeding/clotting.   Patient educated on compliance with dosing, follow up appointments, and prescribed plan of care.

## 2024-09-30 ENCOUNTER — ANTICOAGULATION - WARFARIN VISIT (OUTPATIENT)
Dept: CARDIOLOGY | Facility: CLINIC | Age: 74
End: 2024-09-30
Payer: MEDICARE

## 2024-09-30 DIAGNOSIS — I48.0 PAROXYSMAL ATRIAL FIBRILLATION (MULTI): Primary | ICD-10-CM

## 2024-09-30 LAB
POC INR: 3.7
POC PROTHROMBIN TIME: NORMAL

## 2024-09-30 PROCEDURE — 99211 OFF/OP EST MAY X REQ PHY/QHP: CPT

## 2024-09-30 PROCEDURE — 85610 PROTHROMBIN TIME: CPT | Mod: QW

## 2024-09-30 NOTE — PROGRESS NOTES
Patient identification verified with 2 identifiers.    Location: [facility] - [address] [place] [id number] [phone]     Referring Physician: Dr. Abi Gonsalves  Enrollment/ Re-enrollment date: 4/3/2025   INR Goal: 2.0-3.0  INR monitoring is per [facility] protocol.  Anticoagulation Medication: warfarin  Indication: Atrial Fibrillation/Atrial Flutter    Subjective   Bleeding signs/symptoms: No    Bruising: No   Major bleeding event: No  Thrombosis signs/symptoms: No  Thromboembolic event: No  Missed doses: No  Extra doses: No  Medication changes: No  Dietary changes: No  Change in health: No  Change in activity: No  Alcohol: No  Other concerns: No    Upcoming Procedures:  Does the Patient Have any upcoming procedures that require interruption in anticoagulation therapy? no  Does the patient require bridging? no      Anticoagulation Summary  As of 9/30/2024      INR goal:  2.0-3.0   TTR:  54.8% (5 mo)   INR used for dosing:  3.70 (9/30/2024)   Weekly warfarin total:  13.75 mg               Assessment/Plan   Supratherapeutic     1. New dose:  Hold Warfarin 10/1.  Reduce TWD 10%.     2. Next INR: 1 week      Education provided to patient during the visit:  Patient instructed to call in interim with questions, concerns and changes.   Patient educated on interactions between medications and warfarin.   Patient educated on dietary consistency in vitamin k consumption.   Patient educated on affects of alcohol consumption while taking warfarin.   Patient educated on signs of bleeding/clotting.   Patient educated on compliance with dosing, follow up appointments, and prescribed plan of care.

## 2024-10-07 ENCOUNTER — ANTICOAGULATION - WARFARIN VISIT (OUTPATIENT)
Dept: CARDIOLOGY | Facility: CLINIC | Age: 74
End: 2024-10-07
Payer: MEDICARE

## 2024-10-07 DIAGNOSIS — I48.0 PAROXYSMAL ATRIAL FIBRILLATION (MULTI): ICD-10-CM

## 2024-10-07 LAB
POC INR: 2.4
POC PROTHROMBIN TIME: NORMAL

## 2024-10-07 PROCEDURE — 85610 PROTHROMBIN TIME: CPT | Mod: QW

## 2024-10-07 PROCEDURE — 99211 OFF/OP EST MAY X REQ PHY/QHP: CPT

## 2024-10-07 NOTE — PROGRESS NOTES
Patient identification verified with 2 identifiers.    Location: [facility] - [address] [place], [place] [address] [phone]     Referring Physician: Dr. Abi Gonsalves  Enrollment/ Re-enrollment date: 4/3/2025   INR Goal: 2.0-3.0  INR monitoring is per [facility] protocol.  Anticoagulation Medication: warfarin  Indication: Atrial Fibrillation/Atrial Flutter    Subjective   Bleeding signs/symptoms: No    Bruising: Yes  pt has some bruising on his arms with no known cause   Major bleeding event: No  Thrombosis signs/symptoms: No  Thromboembolic event: No  Missed doses: No  Extra doses: No  Medication changes: No  Dietary changes: No  Change in health: No  Change in activity: No  Alcohol: No  Other concerns: No    Upcoming Procedures:  Does the Patient Have any upcoming procedures that require interruption in anticoagulation therapy? no  Does the patient require bridging? no      Anticoagulation Summary  As of 10/7/2024      INR goal:  2.0-3.0   TTR:  54.4% (5.2 mo)   INR used for dosin.40 (10/7/2024)   Weekly warfarin total:  12.5 mg               Assessment/Plan   Therapeutic     1. New dose:  Will decrease dose slightly as pt held dose last week      2. Next INR: 1 week      Education provided to patient during the visit:  Patient instructed to call in interim with questions, concerns and changes.   Patient educated on interactions between medications and warfarin.   Patient educated on dietary consistency in vitamin k consumption.   Patient educated on affects of alcohol consumption while taking warfarin.   Patient educated on signs of bleeding/clotting.   Patient educated on compliance with dosing, follow up appointments, and prescribed plan of care.

## 2024-10-14 ENCOUNTER — ANTICOAGULATION - WARFARIN VISIT (OUTPATIENT)
Dept: CARDIOLOGY | Facility: CLINIC | Age: 74
End: 2024-10-14
Payer: MEDICARE

## 2024-10-14 DIAGNOSIS — I48.0 PAROXYSMAL ATRIAL FIBRILLATION (MULTI): Primary | ICD-10-CM

## 2024-10-14 LAB
POC INR: 2.9
POC PROTHROMBIN TIME: NORMAL

## 2024-10-14 PROCEDURE — 85610 PROTHROMBIN TIME: CPT | Mod: QW

## 2024-10-14 PROCEDURE — 99211 OFF/OP EST MAY X REQ PHY/QHP: CPT

## 2024-10-14 NOTE — PROGRESS NOTES
Patient identification verified with 2 identifiers.    Location: [facility] - [address] [place] [address] [phone]     Referring Physician: Dr. Eloina Gonsalves  Enrollment/ Re-enrollment date: 4/3/2025   INR Goal: 2.0-3.0  INR monitoring is per [facility] protocol.  Anticoagulation Medication: warfarin  Indication: Atrial Fibrillation/Atrial Flutter    Subjective   Bleeding signs/symptoms: No  Bruising: No   Major bleeding event: No  Thrombosis signs/symptoms: No  Thromboembolic event: No  Missed doses: No  Extra doses: No  Medication changes: No  Dietary changes: No  Change in health: No  Change in activity: No  Alcohol: No  Other concerns: No    Upcoming Procedures:  Does the Patient Have any upcoming procedures that require interruption in anticoagulation therapy? no  Does the patient require bridging? no      Anticoagulation Summary  As of 10/14/2024      INR goal:  2.0-3.0   TTR:  56.4% (5.5 mo)   INR used for dosin.90 (10/14/2024)   Weekly warfarin total:  12.5 mg               Assessment/Plan   Therapeutic     1. New dose: no change    2. Next INR: 2 weeks      Education provided to patient during the visit:  Patient instructed to call in interim with questions, concerns and changes.   Patient educated on interactions between medications and warfarin.   Patient educated on dietary consistency in vitamin k consumption.   Patient educated on affects of alcohol consumption while taking warfarin.   Patient educated on signs of bleeding/clotting.   Patient educated on compliance with dosing, follow up appointments, and prescribed plan of care.

## 2024-10-19 DIAGNOSIS — I48.0 PAROXYSMAL ATRIAL FIBRILLATION (MULTI): ICD-10-CM

## 2024-10-19 DIAGNOSIS — Z79.899 LONG TERM CURRENT USE OF AMIODARONE: ICD-10-CM

## 2024-10-21 ENCOUNTER — LAB (OUTPATIENT)
Dept: LAB | Facility: LAB | Age: 74
End: 2024-10-21
Payer: MEDICARE

## 2024-10-21 DIAGNOSIS — E78.2 MIXED HYPERLIPIDEMIA: ICD-10-CM

## 2024-10-21 DIAGNOSIS — E66.812 CLASS 2 OBESITY WITHOUT SERIOUS COMORBIDITY WITH BODY MASS INDEX (BMI) OF 36.0 TO 36.9 IN ADULT, UNSPECIFIED OBESITY TYPE: ICD-10-CM

## 2024-10-21 DIAGNOSIS — I49.3 PVC (PREMATURE VENTRICULAR CONTRACTION): ICD-10-CM

## 2024-10-21 DIAGNOSIS — E55.9 VITAMIN D DEFICIENCY: ICD-10-CM

## 2024-10-21 DIAGNOSIS — I48.3 TYPICAL ATRIAL FLUTTER (MULTI): ICD-10-CM

## 2024-10-21 DIAGNOSIS — I10 PRIMARY HYPERTENSION: ICD-10-CM

## 2024-10-21 DIAGNOSIS — Z01.89 ENCOUNTER FOR ROUTINE LABORATORY TESTING: ICD-10-CM

## 2024-10-21 DIAGNOSIS — N18.31 STAGE 3A CHRONIC KIDNEY DISEASE (MULTI): ICD-10-CM

## 2024-10-21 DIAGNOSIS — D69.6 THROMBOCYTOPENIA (CMS-HCC): ICD-10-CM

## 2024-10-21 DIAGNOSIS — I48.0 PAROXYSMAL ATRIAL FIBRILLATION (MULTI): ICD-10-CM

## 2024-10-21 LAB
25(OH)D3 SERPL-MCNC: 62 NG/ML (ref 30–100)
ALBUMIN SERPL BCP-MCNC: 3.9 G/DL (ref 3.4–5)
ALP SERPL-CCNC: 103 U/L (ref 33–136)
ALT SERPL W P-5'-P-CCNC: 15 U/L (ref 10–52)
ANION GAP SERPL CALCULATED.3IONS-SCNC: 9 MMOL/L (ref 10–20)
AST SERPL W P-5'-P-CCNC: 15 U/L (ref 9–39)
BASOPHILS # BLD AUTO: 0.11 X10*3/UL (ref 0–0.1)
BASOPHILS NFR BLD AUTO: 1.5 %
BILIRUB DIRECT SERPL-MCNC: 0.1 MG/DL (ref 0–0.3)
BILIRUB SERPL-MCNC: 0.7 MG/DL (ref 0–1.2)
BUN SERPL-MCNC: 10 MG/DL (ref 6–23)
CALCIUM SERPL-MCNC: 8.9 MG/DL (ref 8.6–10.3)
CHLORIDE SERPL-SCNC: 103 MMOL/L (ref 98–107)
CO2 SERPL-SCNC: 31 MMOL/L (ref 21–32)
CREAT SERPL-MCNC: 1.08 MG/DL (ref 0.5–1.3)
EGFRCR SERPLBLD CKD-EPI 2021: 71 ML/MIN/1.73M*2
EOSINOPHIL # BLD AUTO: 0.07 X10*3/UL (ref 0–0.4)
EOSINOPHIL NFR BLD AUTO: 0.9 %
ERYTHROCYTE [DISTWIDTH] IN BLOOD BY AUTOMATED COUNT: 14.7 % (ref 11.5–14.5)
GLUCOSE SERPL-MCNC: 93 MG/DL (ref 74–99)
HCT VFR BLD AUTO: 43.7 % (ref 41–52)
HGB BLD-MCNC: 13.9 G/DL (ref 13.5–17.5)
IMM GRANULOCYTES # BLD AUTO: 0.04 X10*3/UL (ref 0–0.5)
IMM GRANULOCYTES NFR BLD AUTO: 0.5 % (ref 0–0.9)
LYMPHOCYTES # BLD AUTO: 0.67 X10*3/UL (ref 0.8–3)
LYMPHOCYTES NFR BLD AUTO: 8.9 %
MAGNESIUM SERPL-MCNC: 1.92 MG/DL (ref 1.6–2.4)
MCH RBC QN AUTO: 29.8 PG (ref 26–34)
MCHC RBC AUTO-ENTMCNC: 31.8 G/DL (ref 32–36)
MCV RBC AUTO: 94 FL (ref 80–100)
MONOCYTES # BLD AUTO: 0.77 X10*3/UL (ref 0.05–0.8)
MONOCYTES NFR BLD AUTO: 10.3 %
NEUTROPHILS # BLD AUTO: 5.85 X10*3/UL (ref 1.6–5.5)
NEUTROPHILS NFR BLD AUTO: 77.9 %
NRBC BLD-RTO: 0 /100 WBCS (ref 0–0)
PHOSPHATE SERPL-MCNC: 3.2 MG/DL (ref 2.5–4.9)
PLATELET # BLD AUTO: 130 X10*3/UL (ref 150–450)
POTASSIUM SERPL-SCNC: 5.3 MMOL/L (ref 3.5–5.3)
PROT SERPL-MCNC: 6.4 G/DL (ref 6.4–8.2)
RBC # BLD AUTO: 4.67 X10*6/UL (ref 4.5–5.9)
SODIUM SERPL-SCNC: 138 MMOL/L (ref 136–145)
WBC # BLD AUTO: 7.5 X10*3/UL (ref 4.4–11.3)

## 2024-10-21 PROCEDURE — 82306 VITAMIN D 25 HYDROXY: CPT

## 2024-10-21 PROCEDURE — 36415 COLL VENOUS BLD VENIPUNCTURE: CPT

## 2024-10-21 PROCEDURE — 80053 COMPREHEN METABOLIC PANEL: CPT

## 2024-10-21 PROCEDURE — 82248 BILIRUBIN DIRECT: CPT

## 2024-10-21 PROCEDURE — 85025 COMPLETE CBC W/AUTO DIFF WBC: CPT

## 2024-10-21 PROCEDURE — 83735 ASSAY OF MAGNESIUM: CPT

## 2024-10-21 PROCEDURE — 84100 ASSAY OF PHOSPHORUS: CPT

## 2024-10-21 RX ORDER — AMIODARONE HYDROCHLORIDE 200 MG/1
TABLET ORAL
Qty: 198 TABLET | Refills: 1 | Status: SHIPPED | OUTPATIENT
Start: 2024-10-21 | End: 2025-10-21

## 2024-10-28 ENCOUNTER — ANTICOAGULATION - WARFARIN VISIT (OUTPATIENT)
Dept: CARDIOLOGY | Facility: CLINIC | Age: 74
End: 2024-10-28
Payer: MEDICARE

## 2024-10-28 DIAGNOSIS — I48.0 PAROXYSMAL ATRIAL FIBRILLATION (MULTI): Primary | ICD-10-CM

## 2024-10-28 LAB
POC INR: 2.7
POC PROTHROMBIN TIME: NORMAL

## 2024-10-28 PROCEDURE — 85610 PROTHROMBIN TIME: CPT | Mod: QW

## 2024-10-29 ENCOUNTER — OFFICE VISIT (OUTPATIENT)
Dept: PRIMARY CARE | Facility: CLINIC | Age: 74
End: 2024-10-29
Payer: MEDICARE

## 2024-10-29 VITALS
SYSTOLIC BLOOD PRESSURE: 124 MMHG | TEMPERATURE: 97 F | OXYGEN SATURATION: 97 % | HEIGHT: 70 IN | HEART RATE: 62 BPM | DIASTOLIC BLOOD PRESSURE: 84 MMHG | BODY MASS INDEX: 33.64 KG/M2 | WEIGHT: 235 LBS

## 2024-10-29 DIAGNOSIS — Z01.89 ENCOUNTER FOR ROUTINE LABORATORY TESTING: ICD-10-CM

## 2024-10-29 DIAGNOSIS — M15.0 PRIMARY OSTEOARTHRITIS INVOLVING MULTIPLE JOINTS: ICD-10-CM

## 2024-10-29 DIAGNOSIS — Z23 ENCOUNTER FOR IMMUNIZATION: ICD-10-CM

## 2024-10-29 DIAGNOSIS — R73.9 HYPERGLYCEMIA: ICD-10-CM

## 2024-10-29 DIAGNOSIS — I48.3 TYPICAL ATRIAL FLUTTER (MULTI): ICD-10-CM

## 2024-10-29 DIAGNOSIS — G47.33 OSA (OBSTRUCTIVE SLEEP APNEA): ICD-10-CM

## 2024-10-29 DIAGNOSIS — I27.20 PULMONARY HYPERTENSION (MULTI): ICD-10-CM

## 2024-10-29 DIAGNOSIS — E66.811 CLASS 1 OBESITY WITHOUT SERIOUS COMORBIDITY WITH BODY MASS INDEX (BMI) OF 33.0 TO 33.9 IN ADULT, UNSPECIFIED OBESITY TYPE: ICD-10-CM

## 2024-10-29 DIAGNOSIS — N40.0 BENIGN PROSTATIC HYPERPLASIA WITHOUT LOWER URINARY TRACT SYMPTOMS: ICD-10-CM

## 2024-10-29 DIAGNOSIS — E03.8 SUBCLINICAL HYPOTHYROIDISM: ICD-10-CM

## 2024-10-29 DIAGNOSIS — I10 PRIMARY HYPERTENSION: ICD-10-CM

## 2024-10-29 DIAGNOSIS — Z71.89 COUNSELING REGARDING ADVANCED CARE PLANNING AND GOALS OF CARE: ICD-10-CM

## 2024-10-29 DIAGNOSIS — N18.31 STAGE 3A CHRONIC KIDNEY DISEASE (MULTI): ICD-10-CM

## 2024-10-29 DIAGNOSIS — E78.2 MIXED HYPERLIPIDEMIA: ICD-10-CM

## 2024-10-29 DIAGNOSIS — I48.0 PAROXYSMAL ATRIAL FIBRILLATION (MULTI): ICD-10-CM

## 2024-10-29 DIAGNOSIS — Z00.00 ANNUAL PHYSICAL EXAM: Primary | ICD-10-CM

## 2024-10-29 DIAGNOSIS — E55.9 VITAMIN D DEFICIENCY: ICD-10-CM

## 2024-10-29 PROCEDURE — 1036F TOBACCO NON-USER: CPT | Performed by: STUDENT IN AN ORGANIZED HEALTH CARE EDUCATION/TRAINING PROGRAM

## 2024-10-29 PROCEDURE — 3079F DIAST BP 80-89 MM HG: CPT | Performed by: STUDENT IN AN ORGANIZED HEALTH CARE EDUCATION/TRAINING PROGRAM

## 2024-10-29 PROCEDURE — 1160F RVW MEDS BY RX/DR IN RCRD: CPT | Performed by: STUDENT IN AN ORGANIZED HEALTH CARE EDUCATION/TRAINING PROGRAM

## 2024-10-29 PROCEDURE — 1123F ACP DISCUSS/DSCN MKR DOCD: CPT | Performed by: STUDENT IN AN ORGANIZED HEALTH CARE EDUCATION/TRAINING PROGRAM

## 2024-10-29 PROCEDURE — 1157F ADVNC CARE PLAN IN RCRD: CPT | Performed by: STUDENT IN AN ORGANIZED HEALTH CARE EDUCATION/TRAINING PROGRAM

## 2024-10-29 PROCEDURE — 3074F SYST BP LT 130 MM HG: CPT | Performed by: STUDENT IN AN ORGANIZED HEALTH CARE EDUCATION/TRAINING PROGRAM

## 2024-10-29 PROCEDURE — 1159F MED LIST DOCD IN RCRD: CPT | Performed by: STUDENT IN AN ORGANIZED HEALTH CARE EDUCATION/TRAINING PROGRAM

## 2024-10-29 PROCEDURE — 1170F FXNL STATUS ASSESSED: CPT | Performed by: STUDENT IN AN ORGANIZED HEALTH CARE EDUCATION/TRAINING PROGRAM

## 2024-10-29 PROCEDURE — 99215 OFFICE O/P EST HI 40 MIN: CPT | Performed by: STUDENT IN AN ORGANIZED HEALTH CARE EDUCATION/TRAINING PROGRAM

## 2024-10-29 PROCEDURE — 99214 OFFICE O/P EST MOD 30 MIN: CPT | Performed by: STUDENT IN AN ORGANIZED HEALTH CARE EDUCATION/TRAINING PROGRAM

## 2024-10-29 PROCEDURE — 1158F ADVNC CARE PLAN TLK DOCD: CPT | Performed by: STUDENT IN AN ORGANIZED HEALTH CARE EDUCATION/TRAINING PROGRAM

## 2024-10-29 PROCEDURE — G0439 PPPS, SUBSEQ VISIT: HCPCS | Performed by: STUDENT IN AN ORGANIZED HEALTH CARE EDUCATION/TRAINING PROGRAM

## 2024-10-29 PROCEDURE — 1126F AMNT PAIN NOTED NONE PRSNT: CPT | Performed by: STUDENT IN AN ORGANIZED HEALTH CARE EDUCATION/TRAINING PROGRAM

## 2024-10-29 ASSESSMENT — ACTIVITIES OF DAILY LIVING (ADL)
MANAGING_FINANCES: INDEPENDENT
DOING_HOUSEWORK: INDEPENDENT
DRESSING: INDEPENDENT
GROCERY_SHOPPING: INDEPENDENT
TAKING_MEDICATION: INDEPENDENT
BATHING: INDEPENDENT

## 2024-10-29 ASSESSMENT — ENCOUNTER SYMPTOMS
GASTROINTESTINAL NEGATIVE: 1
MUSCULOSKELETAL NEGATIVE: 1
CARDIOVASCULAR NEGATIVE: 1
PSYCHIATRIC NEGATIVE: 1
ENDOCRINE NEGATIVE: 1
ALLERGIC/IMMUNOLOGIC NEGATIVE: 1
CONSTITUTIONAL NEGATIVE: 1
HEMATOLOGIC/LYMPHATIC NEGATIVE: 1
NEUROLOGICAL NEGATIVE: 1
EYES NEGATIVE: 1
RESPIRATORY NEGATIVE: 1

## 2024-10-29 ASSESSMENT — PAIN SCALES - GENERAL: PAINLEVEL_OUTOF10: 0-NO PAIN

## 2024-11-11 ENCOUNTER — ANTICOAGULATION - WARFARIN VISIT (OUTPATIENT)
Dept: CARDIOLOGY | Facility: CLINIC | Age: 74
End: 2024-11-11
Payer: MEDICARE

## 2024-11-11 DIAGNOSIS — I48.0 PAROXYSMAL ATRIAL FIBRILLATION (MULTI): Primary | ICD-10-CM

## 2024-11-11 LAB
POC INR: 2.5
POC PROTHROMBIN TIME: NORMAL

## 2024-11-11 PROCEDURE — 85610 PROTHROMBIN TIME: CPT | Mod: QW

## 2024-11-11 PROCEDURE — 99211 OFF/OP EST MAY X REQ PHY/QHP: CPT

## 2024-11-11 NOTE — PROGRESS NOTES
Patient identification verified with 2 identifiers.    Location: [facility] - [address] [place] [id number] [phone]     Referring Physician: Dr. Abi Gonsalves  Enrollment/ Re-enrollment date: 4/3/2025   INR Goal: 2.0-3.0  INR monitoring is per [facility] protocol.  Anticoagulation Medication: warfarin  Indication: Atrial Fibrillation/Atrial Flutter    Subjective   Bleeding signs/symptoms: No    Bruising: No   Major bleeding event: No  Thrombosis signs/symptoms: No  Thromboembolic event: No  Missed doses: No  Extra doses: No  Medication changes: No  Dietary changes: No  Change in health: No  Change in activity: No  Alcohol: No  Other concerns: No    Upcoming Procedures:  Does the Patient Have any upcoming procedures that require interruption in anticoagulation therapy? no  Does the patient require bridging? no      Anticoagulation Summary  As of 2024      INR goal:  2.0-3.0   TTR:  62.7% (6.4 mo)   INR used for dosin.50 (2024)   Weekly warfarin total:  12.5 mg               Assessment/Plan   Therapeutic     1. New dose: no change    2. Next INR: 1 month      Education provided to patient during the visit:  Patient instructed to call in interim with questions, concerns and changes.   Patient educated on interactions between medications and warfarin.   Patient educated on dietary consistency in vitamin k consumption.   Patient educated on affects of alcohol consumption while taking warfarin.   Patient educated on signs of bleeding/clotting.   Patient educated on compliance with dosing, follow up appointments, and prescribed plan of care.

## 2024-12-09 ENCOUNTER — ANTICOAGULATION - WARFARIN VISIT (OUTPATIENT)
Dept: CARDIOLOGY | Facility: CLINIC | Age: 74
End: 2024-12-09
Payer: MEDICARE

## 2024-12-09 DIAGNOSIS — I48.0 PAROXYSMAL ATRIAL FIBRILLATION (MULTI): Primary | ICD-10-CM

## 2024-12-09 LAB
POC INR: 2
POC PROTHROMBIN TIME: NORMAL

## 2024-12-09 PROCEDURE — 85610 PROTHROMBIN TIME: CPT | Mod: QW

## 2024-12-09 PROCEDURE — 99211 OFF/OP EST MAY X REQ PHY/QHP: CPT

## 2024-12-09 NOTE — PROGRESS NOTES
Patient identification verified with 2 identifiers.    Location: [facility] - [address] [place] [id number] [phone]     Referring Physician: Dr. Abi Gonsalves  Enrollment/ Re-enrollment date: 4/3/2025   INR Goal: 2.0-3.0  INR monitoring is per [facility] protocol.  Anticoagulation Medication: warfarin  Indication: Atrial Fibrillation/Atrial Flutter    Subjective   Bleeding signs/symptoms: No    Bruising: No   Major bleeding event: No  Thrombosis signs/symptoms: No  Thromboembolic event: No  Missed doses: No  Extra doses: No  Medication changes: No  Dietary changes: No  Change in health: No  Change in activity: No  Alcohol: No  Other concerns: No    Upcoming Procedures:  Does the Patient Have any upcoming procedures that require interruption in anticoagulation therapy? no  Does the patient require bridging? no      Anticoagulation Summary  As of 2024      INR goal:  2.0-3.0   TTR:  67.5% (7.3 mo)   INR used for dosin.00 (2024)   Weekly warfarin total:  12.5 mg               Assessment/Plan   Therapeutic     1. New dose: no change    2. Next INR: 1 month      Education provided to patient during the visit:  Patient instructed to call in interim with questions, concerns and changes.   Patient educated on interactions between medications and warfarin.   Patient educated on dietary consistency in vitamin k consumption.   Patient educated on affects of alcohol consumption while taking warfarin.   Patient educated on signs of bleeding/clotting.   Patient educated on compliance with dosing, follow up appointments, and prescribed plan of care.

## 2024-12-13 ENCOUNTER — APPOINTMENT (OUTPATIENT)
Dept: RADIOLOGY | Facility: HOSPITAL | Age: 74
End: 2024-12-13
Payer: MEDICARE

## 2024-12-13 ENCOUNTER — HOSPITAL ENCOUNTER (EMERGENCY)
Facility: HOSPITAL | Age: 74
Discharge: HOME | End: 2024-12-13
Attending: EMERGENCY MEDICINE
Payer: MEDICARE

## 2024-12-13 VITALS
TEMPERATURE: 97.8 F | DIASTOLIC BLOOD PRESSURE: 84 MMHG | WEIGHT: 239.42 LBS | HEART RATE: 65 BPM | OXYGEN SATURATION: 96 % | HEIGHT: 70 IN | SYSTOLIC BLOOD PRESSURE: 149 MMHG | RESPIRATION RATE: 15 BRPM | BODY MASS INDEX: 34.28 KG/M2

## 2024-12-13 DIAGNOSIS — R00.2 PALPITATIONS: Primary | ICD-10-CM

## 2024-12-13 LAB
ANION GAP SERPL CALCULATED.3IONS-SCNC: 12 MMOL/L (ref 10–20)
BASOPHILS # BLD AUTO: 0.1 X10*3/UL (ref 0–0.1)
BASOPHILS NFR BLD AUTO: 1.5 %
BUN SERPL-MCNC: 21 MG/DL (ref 6–23)
CALCIUM SERPL-MCNC: 8.8 MG/DL (ref 8.6–10.3)
CARDIAC TROPONIN I PNL SERPL HS: 6 NG/L (ref 0–20)
CARDIAC TROPONIN I PNL SERPL HS: 7 NG/L (ref 0–20)
CHLORIDE SERPL-SCNC: 102 MMOL/L (ref 98–107)
CO2 SERPL-SCNC: 26 MMOL/L (ref 21–32)
CREAT SERPL-MCNC: 1.3 MG/DL (ref 0.5–1.3)
EGFRCR SERPLBLD CKD-EPI 2021: 57 ML/MIN/1.73M*2
EOSINOPHIL # BLD AUTO: 0.06 X10*3/UL (ref 0–0.4)
EOSINOPHIL NFR BLD AUTO: 0.9 %
ERYTHROCYTE [DISTWIDTH] IN BLOOD BY AUTOMATED COUNT: 14.6 % (ref 11.5–14.5)
GLUCOSE SERPL-MCNC: 143 MG/DL (ref 74–99)
HCT VFR BLD AUTO: 42.1 % (ref 41–52)
HGB BLD-MCNC: 13.7 G/DL (ref 13.5–17.5)
IMM GRANULOCYTES # BLD AUTO: 0.04 X10*3/UL (ref 0–0.5)
IMM GRANULOCYTES NFR BLD AUTO: 0.6 % (ref 0–0.9)
LYMPHOCYTES # BLD AUTO: 1.08 X10*3/UL (ref 0.8–3)
LYMPHOCYTES NFR BLD AUTO: 15.7 %
MAGNESIUM SERPL-MCNC: 2.04 MG/DL (ref 1.6–2.4)
MCH RBC QN AUTO: 29.6 PG (ref 26–34)
MCHC RBC AUTO-ENTMCNC: 32.5 G/DL (ref 32–36)
MCV RBC AUTO: 91 FL (ref 80–100)
MONOCYTES # BLD AUTO: 0.94 X10*3/UL (ref 0.05–0.8)
MONOCYTES NFR BLD AUTO: 13.6 %
NEUTROPHILS # BLD AUTO: 4.67 X10*3/UL (ref 1.6–5.5)
NEUTROPHILS NFR BLD AUTO: 67.7 %
NRBC BLD-RTO: 0 /100 WBCS (ref 0–0)
PLATELET # BLD AUTO: 128 X10*3/UL (ref 150–450)
POTASSIUM SERPL-SCNC: 3.8 MMOL/L (ref 3.5–5.3)
RBC # BLD AUTO: 4.63 X10*6/UL (ref 4.5–5.9)
SODIUM SERPL-SCNC: 136 MMOL/L (ref 136–145)
WBC # BLD AUTO: 6.9 X10*3/UL (ref 4.4–11.3)

## 2024-12-13 PROCEDURE — 36415 COLL VENOUS BLD VENIPUNCTURE: CPT | Performed by: EMERGENCY MEDICINE

## 2024-12-13 PROCEDURE — 84484 ASSAY OF TROPONIN QUANT: CPT | Performed by: EMERGENCY MEDICINE

## 2024-12-13 PROCEDURE — 83735 ASSAY OF MAGNESIUM: CPT | Performed by: EMERGENCY MEDICINE

## 2024-12-13 PROCEDURE — 71045 X-RAY EXAM CHEST 1 VIEW: CPT | Performed by: RADIOLOGY

## 2024-12-13 PROCEDURE — 85025 COMPLETE CBC W/AUTO DIFF WBC: CPT | Performed by: EMERGENCY MEDICINE

## 2024-12-13 PROCEDURE — 71045 X-RAY EXAM CHEST 1 VIEW: CPT

## 2024-12-13 PROCEDURE — 99284 EMERGENCY DEPT VISIT MOD MDM: CPT | Mod: 25 | Performed by: EMERGENCY MEDICINE

## 2024-12-13 PROCEDURE — 80048 BASIC METABOLIC PNL TOTAL CA: CPT | Performed by: EMERGENCY MEDICINE

## 2024-12-13 ASSESSMENT — PAIN SCALES - GENERAL: PAINLEVEL_OUTOF10: 0 - NO PAIN

## 2024-12-13 ASSESSMENT — PAIN - FUNCTIONAL ASSESSMENT: PAIN_FUNCTIONAL_ASSESSMENT: 0-10

## 2024-12-14 NOTE — ED PROVIDER NOTES
HPI   Chief Complaint   Patient presents with    Irregular Heart Beat     Pt reports he had assessed his heart rate via pulse ox 1 hr prior to coming in which read arrhythmia. Pt has a HX of afib rvr, has been cardioverted before. Takes amiodarone and warfarin. Denies chest pain and sob currently.        HPI  74-year-old male with a history of atrial fibrillation presents concerned that he may be in atrial fibrillation.  The patient states he was walking up the stairs when he noticed his heart rate was fast on the monitor.  He was reading is irregular.  He is on amiodarone and metoprolol and Coumadin.  He felt slightly short of breath when that occurred.  He kept the monitor on his finger and it fluctuated between 170s to 100 so he wanted to come the Emergency Department to get checked out.  He denies any chest pain.  No nausea or vomiting.  No history of recent illness.  He is not on any diuretics.  He has no other complaints.      Patient History   Past Medical History:   Diagnosis Date    Aneurysm (CMS-HCC)     Atrial fibrillation (Multi)     Clotting disorder (Multi)     Hypertension      Past Surgical History:   Procedure Laterality Date    ARTERIAL ANEURYSM REPAIR      CATARACT EXTRACTION  06/23/2016    Cataract Surgery    COLONOSCOPY  06/23/2016    Complete Colonoscopy    CT ABDOMEN PELVIS ANGIOGRAM W AND/OR WO IV CONTRAST  11/04/2020    CT ABDOMEN PELVIS ANGIOGRAM W AND/OR WO IV CONTRAST [facility]    HEMORRHOID SURGERY  06/23/2016    Hemorrhoidectomy    OTHER SURGICAL HISTORY  03/28/2022    Abdominal aortic aneurysm repair     Family History   Problem Relation Name Age of Onset    Alzheimer's disease Mother      Liver cancer Other SIBLINGS      Social History     Tobacco Use    Smoking status: Former     Types: Cigarettes     Passive exposure: Past    Smokeless tobacco: Never    Tobacco comments:     Pt quit about 10 years ago   Vaping Use    Vaping status: Never Used   Substance Use Topics     Alcohol use: Not Currently    Drug use: Never       Physical Exam   ED Triage Vitals [12/13/24 1831]   Temperature Heart Rate Respirations BP   36.6 °C (97.8 °F) 67 18 136/75      Pulse Ox Temp Source Heart Rate Source Patient Position   97 % Temporal Monitor Sitting      BP Location FiO2 (%)     Left arm --       Physical Exam  General:  Awake, alert, no acute distress.  Head: Normocephalic, Atraumatic  Neck: Supple, trachea midline, no stridor  Skin: Warm and dry, no rashes   Lungs: Clear to auscultation bilaterally no acute respiratory distress, speaking in full sentences without difficulty  CV: Regular Rate Rhythm with no obvious murmurs gallops rubs noted, no jugular venous distention, no pedal edema   Abdomen: Soft, nontender, nondistended, positive bowel sounds, no peritoneal signs  Neuro:  No gross focal neurologic deficits, NIH is 0  Musculoskeletal:  Full range of motion in all 4 extremities  Psychiatric:  Alert oriented x 3, Good insight into condition.    ED Course & MDM   Diagnoses as of 12/13/24 2058   Palpitations                 No data recorded     [place] Coma Scale Score: 15 (12/13/24 1833 : Kyle Guerrero, EMT)                           Medical Decision Making  My EKG interpretation 1829:  Normal sinus rhythm 71 bpm normal axis WA interval 150 QTc 465 no ectopy or acute ischemic changes noted    Patient's workup in the ED is unremarkable he had no return of his symptoms.  I suspect the patient probably had a transient episode of atrial fibrillation causing his symptoms.  I discussed this with him at bedside.  At this point I feel comfortable any emergent etiology been excluded.  I feel is stable for discharge to home with follow-up with his cardiologist but to return if his condition worsens.    Procedure  Procedures     Andre Torrez,   12/13/24 2059

## 2024-12-14 NOTE — DISCHARGE INSTRUCTIONS
Thank you for choosing [facility]. It was my pleasure to be involved in your care today.         As of today's visit, based on reasonable likelihood, that it is safe for you to be discharged back to your residence to follow-up as an outpatient for ongoing management of your medical problem. You should follow-up with any referrals / primary provider as soon as possible. The contacts (number, addresses) are listed below.         Important:  Even though we think it is safe for you to go home, there is always a small chance that we are missing something that could require hospitalization.  Therefore it is very important that if you get worse or develops any new symptoms that you return here as soon as possible to be re-evaluated.  This includes return of symptoms that have resolved such as fainting, chest pain, or symptoms that could be warning signs for stroke important:  Even though we think it is safe for you to go home, there is always a small chance that we are missing something that could require hospitalization.  Therefore it is very important that if you get worse or develops any new symptoms that you return here as soon as possible to be re-evaluated.  This includes return of symptoms that have resolved such as fainting, chest pain, or symptoms that could be warning signs for stroke         Make sure your pharmacy and primary doctor is aware of any new medications prescribed today.          It is your responsibility to contact as soon as possible, and follow through with, any referrals you were given today. We do recommend you inform them you are a [facility] follow-up patient, as often they can better accommodate your need to be seen, provided their schedules allow. We will, and have, made every effort to ensure you have access to adequate follow-up specialists available.          All problems may not be able to be fixed in one ER visit. This is why timely ongoing care is important, and this  is a responsibility you share in. Further, you are free to follow up with any provider you choose, and this is not limited to our suggestion.          If cultures were obtained today, you will be contacted should anything result that would require further treatment. Please contact the ED at the number provided with questions.          Having trouble affording medications? Try [email or web address]! (This is not a hospital endorsed website, merely a recommendation based on my own personal experiences with [employer])

## 2025-01-06 ENCOUNTER — APPOINTMENT (OUTPATIENT)
Dept: CARDIOLOGY | Facility: CLINIC | Age: 75
End: 2025-01-06
Payer: MEDICARE

## 2025-01-08 ENCOUNTER — ANTICOAGULATION - WARFARIN VISIT (OUTPATIENT)
Dept: CARDIOLOGY | Facility: CLINIC | Age: 75
End: 2025-01-08
Payer: MEDICARE

## 2025-01-08 DIAGNOSIS — I48.0 PAROXYSMAL ATRIAL FIBRILLATION (MULTI): Primary | ICD-10-CM

## 2025-01-08 LAB
POC INR: 2.1
POC PROTHROMBIN TIME: NORMAL

## 2025-01-08 PROCEDURE — 85610 PROTHROMBIN TIME: CPT | Mod: QW

## 2025-01-08 PROCEDURE — 99211 OFF/OP EST MAY X REQ PHY/QHP: CPT

## 2025-01-08 NOTE — PROGRESS NOTES
Patient identification verified with 2 identifiers.    Location: [facility] - [address] [place] [id number] [phone]     Referring Physician: Dr. Abi Gonsalves  Enrollment/ Re-enrollment date: 4/3/2025   INR Goal: 2.0-3.0  INR monitoring is per [facility] protocol.  Anticoagulation Medication: warfarin  Indication: Atrial Fibrillation/Atrial Flutter    Subjective   Bleeding signs/symptoms: No    Bruising: No   Major bleeding event: No  Thrombosis signs/symptoms: No  Thromboembolic event: No  Missed doses: No  Extra doses: No  Medication changes: No  Dietary changes: No  Change in health: No  Change in activity: No  Alcohol: No  Other concerns: No    Upcoming Procedures:  Does the Patient Have any upcoming procedures that require interruption in anticoagulation therapy? no  Does the patient require bridging? no      Anticoagulation Summary  As of 2025      INR goal:  2.0-3.0   TTR:  71.4% (8.3 mo)   INR used for dosin.10 (2025)   Weekly warfarin total:  12.5 mg               Assessment/Plan   Therapeutic     1. New dose: no change    2. Next INR: 1 month      Education provided to patient during the visit:  Patient instructed to call in interim with questions, concerns and changes.   Patient educated on interactions between medications and warfarin.   Patient educated on dietary consistency in vitamin k consumption.   Patient educated on affects of alcohol consumption while taking warfarin.   Patient educated on signs of bleeding/clotting.   Patient educated on compliance with dosing, follow up appointments, and prescribed plan of care.

## 2025-01-22 ENCOUNTER — TELEPHONE (OUTPATIENT)
Dept: CARDIOLOGY | Facility: CLINIC | Age: 75
End: 2025-01-22
Payer: MEDICARE

## 2025-01-22 DIAGNOSIS — I10 PRIMARY HYPERTENSION: ICD-10-CM

## 2025-01-22 DIAGNOSIS — I49.3 PVC (PREMATURE VENTRICULAR CONTRACTION): ICD-10-CM

## 2025-01-22 DIAGNOSIS — I48.0 PAROXYSMAL ATRIAL FIBRILLATION (MULTI): ICD-10-CM

## 2025-01-22 DIAGNOSIS — I48.3 TYPICAL ATRIAL FLUTTER (MULTI): ICD-10-CM

## 2025-01-22 NOTE — TELEPHONE ENCOUNTER
Voice mail message from patient requesting metoprolol tartrate renewal: 90 day supply of 100 mg tablets.   He takes 50 mg 3x/day.   Patient indicated [facility] in [facility] as pharmacy of choice and refill electronically submitted.

## 2025-01-23 RX ORDER — METOPROLOL TARTRATE 100 MG/1
50 TABLET ORAL 3 TIMES DAILY
Qty: 150 TABLET | Refills: 3 | Status: SHIPPED | OUTPATIENT
Start: 2025-01-23

## 2025-02-03 ENCOUNTER — ANTICOAGULATION - WARFARIN VISIT (OUTPATIENT)
Dept: CARDIOLOGY | Facility: CLINIC | Age: 75
End: 2025-02-03
Payer: MEDICARE

## 2025-02-03 DIAGNOSIS — I48.0 PAROXYSMAL ATRIAL FIBRILLATION (MULTI): Primary | ICD-10-CM

## 2025-02-03 LAB
POC INR: 2
POC PROTHROMBIN TIME: NORMAL

## 2025-02-03 PROCEDURE — 99211 OFF/OP EST MAY X REQ PHY/QHP: CPT

## 2025-02-03 PROCEDURE — 85610 PROTHROMBIN TIME: CPT | Mod: QW

## 2025-02-03 NOTE — PROGRESS NOTES
Patient identification verified with 2 identifiers.    Location: [facility] - [address] [place] [address] [phone]     Referring Physician: Dr. Abi Gonsalves  Enrollment/ Re-enrollment date: 4/3/2025   INR Goal: 2.0-3.0  INR monitoring is per [facility] protocol.  Anticoagulation Medication: warfarin  Indication: Atrial Fibrillation/Atrial Flutter    Subjective   Bleeding signs/symptoms: No    Bruising: No   Major bleeding event: No  Thrombosis signs/symptoms: No  Thromboembolic event: No  Missed doses: No  Extra doses: No  Medication changes: No  Dietary changes: No  Change in health: No  Change in activity: No  Alcohol: No  Other concerns: No    Upcoming Procedures:  Does the Patient Have any upcoming procedures that require interruption in anticoagulation therapy? no  Does the patient require bridging? no      Anticoagulation Summary  As of 2/3/2025      INR goal:  2.0-3.0   TTR:  74.1% (9.2 mo)   INR used for dosin.00 (2/3/2025)   Weekly warfarin total:  12.5 mg               Assessment/Plan   Therapeutic     1. New dose: no change    2. Next INR: 1 month      Education provided to patient during the visit:  Patient instructed to call in interim with questions, concerns and changes.   Patient educated on interactions between medications and warfarin.   Patient educated on dietary consistency in vitamin k consumption.   Patient educated on affects of alcohol consumption while taking warfarin.   Patient educated on signs of bleeding/clotting.   Patient educated on compliance with dosing, follow up appointments, and prescribed plan of care.

## 2025-02-11 ENCOUNTER — OFFICE VISIT (OUTPATIENT)
Dept: CARDIOLOGY | Facility: CLINIC | Age: 75
End: 2025-02-11
Payer: MEDICARE

## 2025-02-11 VITALS
OXYGEN SATURATION: 97 % | HEART RATE: 54 BPM | BODY MASS INDEX: 35.15 KG/M2 | WEIGHT: 245.5 LBS | SYSTOLIC BLOOD PRESSURE: 157 MMHG | DIASTOLIC BLOOD PRESSURE: 82 MMHG | HEIGHT: 70 IN

## 2025-02-11 DIAGNOSIS — I48.0 PAROXYSMAL ATRIAL FIBRILLATION (MULTI): Primary | ICD-10-CM

## 2025-02-11 PROCEDURE — 1157F ADVNC CARE PLAN IN RCRD: CPT | Performed by: INTERNAL MEDICINE

## 2025-02-11 PROCEDURE — 3079F DIAST BP 80-89 MM HG: CPT | Performed by: INTERNAL MEDICINE

## 2025-02-11 PROCEDURE — 1126F AMNT PAIN NOTED NONE PRSNT: CPT | Performed by: INTERNAL MEDICINE

## 2025-02-11 PROCEDURE — 99214 OFFICE O/P EST MOD 30 MIN: CPT | Performed by: INTERNAL MEDICINE

## 2025-02-11 PROCEDURE — 1159F MED LIST DOCD IN RCRD: CPT | Performed by: INTERNAL MEDICINE

## 2025-02-11 PROCEDURE — 3077F SYST BP >= 140 MM HG: CPT | Performed by: INTERNAL MEDICINE

## 2025-02-11 PROCEDURE — 1036F TOBACCO NON-USER: CPT | Performed by: INTERNAL MEDICINE

## 2025-02-11 ASSESSMENT — PAIN SCALES - GENERAL: PAINLEVEL_OUTOF10: 0-NO PAIN

## 2025-02-11 ASSESSMENT — CHA2DS2 SCORE
CHA2D2S VASC SCORE: 4
VASCULAR DISEASE: YES
CHF OR LEFT VENTRICULAR DYSFUNCTION: NO
SEX: MALE
PRIOR STROKE OR TIA OR THROMBOEMBOLISM: NO
HYPERTENSION: YES
AGE IN YEARS: 75+
DIABETES: NO

## 2025-02-11 NOTE — PROGRESS NOTES
Returns for follow up visit for amiodarone follow up.        History Of Present Illness:    Anderson Barlow is a 74 y.o. year old male patient     73 yo with diagnosis of atrial fibrillation. He presented with a fever of 102 Degrees and cellulitis. Incidentally he was found to be in atrial fibrillation with RVR. Oct 31, 2017. He initially converted with diltiazem but then this was recurred. He was initiated on propafenone 150 mg three time a day. This worked well for some time.      In December 2020 he had a gift of a heart monitor for [employer]. He then noted recurrent episodes of rapid heart rates. He went to [facility] and they gave him some additional Diltiazem with DC conversion. He put his propafenone up to 3 x a day. He has cut the caffeine down but is still ingesting 1 cups a day. He then was undergoing AAA resection. 7/28/21. He was started on amiodarone 400 m g daily - his last recurrence early September.   In November 2021 we decreased his maintenance amiodarone to 200 mg dailly  He decreased the amiodarone 100 mg M-W _F and 200 mg rest of the days. He has had no recurrence.  His last TSH  was 5.67 and T4 nml 4/2023     Usually his AF is paroxysmal but he did have one cardioversion he recalls.  He reports significant offset pauses after conversion.    No episodes of AF to report.  He had one episode in December with irregular heart beats but monitor showed heart rates in the  range.  HE was checked out but it did not show arrhythmia.      No falls recently.  Good about meds but may forget the mid day dose.       Past Medical History:  Past Medical History:   Diagnosis Date    Aneurysm (CMS-HCC)     Atrial fibrillation (Multi)     Clotting disorder (Multi)     Hypertension        Past Surgical History:  Past Surgical History:   Procedure Laterality Date    ARTERIAL ANEURYSM REPAIR      CATARACT EXTRACTION  06/23/2016    Cataract Surgery    COLONOSCOPY  06/23/2016    Complete Colonoscopy    CT ABDOMEN PELVIS  "ANGIOGRAM W AND/OR WO IV CONTRAST  11/04/2020    CT ABDOMEN PELVIS ANGIOGRAM W AND/OR WO IV CONTRAST [facility]    HEMORRHOID SURGERY  06/23/2016    Hemorrhoidectomy    OTHER SURGICAL HISTORY  03/28/2022    Abdominal aortic aneurysm repair          Social History:  Caffeine:  2 large cups coffee/day  Cigarettes: none  ETOH: none  Drugs: none  Exercise: some more in the summer   Occ: retired  Marital status:  M- caregiver to wife with memory issues    Family History:  Family History   Problem Relation Name Age of Onset    Alzheimer's disease Mother      Liver cancer Other SIBLINGS         Allergies:  Allergies   Allergen Reactions    Codeine Unknown    Hydrocodone Other     MENTAL STATUS CHANGE    Hydrocodone-Acetaminophen Other     AMS    Lisinopril Other and Unknown     CHOKING    Oxycodone-Acetaminophen Unknown    Succinylcholine Other and Unknown     \"CAUSED FATHER'S PROSTATE CANCER\"    Cephalexin Diarrhea and Palpitations     VOMITING  HEART RACING  FACE FLUSHING    Penicillins Rash        Outpatient Medications:  Current Outpatient Medications   Medication Instructions    acetaminophen (TYLENOL EXTRA STRENGTH) 500-1,000 mg, oral, 3 times daily PRN    amiodarone (Pacerone) 200 mg tablet TAKE 1 TABLET (200 MG) BY MOUTH 4 TIMES A WEEK AND 0.5 TABLETS (100 MG) 3 TIMES A WEEK.    atorvastatin (LIPITOR) 80 mg, oral, Daily    cholecalciferol (VITAMIN D3) 50 mcg, oral, Daily    ketoconazole (NIZOral) 2 % cream Daily PRN    losartan (COZAAR) 50 mg, oral, Daily    metoprolol tartrate (LOPRESSOR) 50 mg, oral, 3 times daily    triamcinolone (Kenalog) 0.1 % cream APPLY TWICE DAILY TO RASH UP TO 2 WEEKS/MONTH AS NEEDED.    warfarin (Coumadin) 2.5 mg tablet Take as directed per Coumadin clinic After Visit Summary.         Last Recorded Vitals:      5/3/2024     9:23 AM 5/15/2024     1:56 PM 7/15/2024     8:56 AM 10/29/2024     9:19 AM 12/13/2024     6:31 PM 12/13/2024     7:45 PM 12/13/2024     8:30 PM   Vitals " "  Systolic 124 131 130 124 136 139 149   Diastolic 72 77 74 84 75 87 84   BP Location  Right arm   Left arm     Heart Rate 52  69 62 67 72 65   Temp 36.5 °C (97.7 °F) 36.3 °C (97.4 °F)  36.1 °C (97 °F) 36.6 °C (97.8 °F)     Resp     18 15 15   Height 1.778 m (5' 10\") 1.778 m (5' 10\")  1.778 m (5' 10\") 1.778 m (5' 10\")     Weight (lb) 255 251 248 235 239.42     BMI 36.59 kg/m2 36.01 kg/m2 35.58 kg/m2 33.72 kg/m2 34.35 kg/m2     BSA (m2) 2.39 m2 2.37 m2 2.35 m2 2.3 m2 2.32 m2     Visit Report Report Report Report Report           Physical Exam:  Physical Exam  Constitutional:       Appearance: Normal appearance. He is obese.   Neck:      Vascular: No carotid bruit.   Cardiovascular:      Rate and Rhythm: Normal rate and regular rhythm.      Chest Wall: PMI is not displaced.      Pulses: Normal pulses.      Heart sounds: S1 normal and S2 normal. No murmur heard.     No S3 or S4 sounds.   Pulmonary:      Effort: Pulmonary effort is normal.      Breath sounds: Normal breath sounds. No rhonchi or rales.   Musculoskeletal:      Right lower leg: No edema.      Left lower leg: No edema.   Skin:     General: Skin is warm and dry.   Neurological:      Mental Status: He is alert and oriented to person, place, and time.   Psychiatric:         Mood and Affect: Mood normal.         Judgment: Judgment normal.          Last Cardiology Tests:  ECG:  ECG SB 51      Echo:  7/2023  CONCLUSIONS:  1. Left ventricular systolic function is normal with a 65% estimated ejection fraction.  2. Trace to mild mitral valve regurgitation.  3. RVSP within normal limits.  4. Mild aortic valve stenosis.  5. There is mild to moderate calcification and reduced mobility of the left coronary aortic valve cusp.  6. The estimated PASP is 32 mmHg.  7. The peak instantaneous gradient of the aortic valve is 28.3 mmHg.  8. There is plaque visualized in the ascending aorta.      Lab review: I have Chemistry CMP:   Lab Results   Component Value Date    ALBUMIN " 3.9 10/21/2024    CALCIUM 8.8 12/13/2024    CO2 26 12/13/2024    CREATININE 1.30 12/13/2024    GLUCOSE 143 (H) 12/13/2024    BILITOT 0.7 10/21/2024    PROT 6.4 10/21/2024    ALT 15 10/21/2024    AST 15 10/21/2024    ALKPHOS 103 10/21/2024   , Chemistry BMP   Lab Results   Component Value Date    GLUCOSE 143 (H) 12/13/2024    CALCIUM 8.8 12/13/2024    CO2 26 12/13/2024    CREATININE 1.30 12/13/2024   , and CBC:  Lab Results   Component Value Date    WBC 6.9 12/13/2024    RBC 4.63 12/13/2024    HGB 13.7 12/13/2024    HCT 42.1 12/13/2024    MCV 91 12/13/2024    MCH 29.6 12/13/2024    MCHC 32.5 12/13/2024    RDW 14.6 (H) 12/13/2024    MPV 11.8 02/07/2023    NRBC 0.0 12/13/2024       Assessment/Plan   Problem List Items Addressed This Visit    None    TSH 6.31 but Free T4 normal (last April ) he is due for blood work again and he will let us know when drawn.   Return in one year.   Eloina Gonsalves MD

## 2025-02-11 NOTE — PATIENT INSTRUCTIONS
It was nice to see you today!    The rhythm looks good -heart rates are a little slow.  Please stop taking the midday dose of the metoprolol.

## 2025-02-18 ENCOUNTER — OFFICE VISIT (OUTPATIENT)
Dept: URGENT CARE | Age: 75
End: 2025-02-18
Payer: MEDICARE

## 2025-02-18 VITALS
RESPIRATION RATE: 15 BRPM | TEMPERATURE: 97.2 F | SYSTOLIC BLOOD PRESSURE: 149 MMHG | HEART RATE: 55 BPM | WEIGHT: 240 LBS | OXYGEN SATURATION: 97 % | DIASTOLIC BLOOD PRESSURE: 63 MMHG | BODY MASS INDEX: 34.44 KG/M2

## 2025-02-18 DIAGNOSIS — H10.31 ACUTE CONJUNCTIVITIS OF RIGHT EYE, UNSPECIFIED ACUTE CONJUNCTIVITIS TYPE: Primary | ICD-10-CM

## 2025-02-18 RX ORDER — POLYMYXIN B SULFATE AND TRIMETHOPRIM 1; 10000 MG/ML; [USP'U]/ML
1 SOLUTION OPHTHALMIC EVERY 4 HOURS
Qty: 10 ML | Refills: 0 | Status: SHIPPED | OUTPATIENT
Start: 2025-02-18 | End: 2025-02-28

## 2025-02-18 ASSESSMENT — ENCOUNTER SYMPTOMS
EYE ITCHING: 1
EYE DISCHARGE: 1
CRUSTING: 1

## 2025-02-18 ASSESSMENT — VISUAL ACUITY: OU: 1

## 2025-02-18 NOTE — PROGRESS NOTES
Subjective   Patient ID: Anderson Barlow is a 74 y.o. male. They present today with a chief complaint of Eye Problem (Woke up this morning and couldn't open his right eye lid, no eye redness.).    History of Present Illness    History provided by:  Patient  Eye Problem  Location:  Right eye  Severity:  Mild  Duration:  5 hours  Timing:  Intermittent  Progression:  Improving  Chronicity:  New  Worsened by:  Nothing  Ineffective treatments:  None tried  Associated symptoms: crusting, discharge and itching        Past Medical History  Allergies as of 02/18/2025 - Reviewed 02/18/2025   Allergen Reaction Noted    Codeine Unknown 08/26/2023    Hydrocodone Other 08/26/2023    Hydrocodone-acetaminophen Other 08/26/2023    Lisinopril Other and Unknown 08/26/2023    Oxycodone-acetaminophen Unknown 08/26/2023    Succinylcholine Other and Unknown 08/26/2023    Cephalexin Diarrhea and Palpitations 08/26/2023    Penicillins Rash 08/26/2023       (Not in a hospital admission)       Past Medical History:   Diagnosis Date    Aneurysm (CMS-[facility])     Atrial fibrillation (Multi)     Clotting disorder (Multi)     Hypertension        Past Surgical History:   Procedure Laterality Date    ARTERIAL ANEURYSM REPAIR      CATARACT EXTRACTION  06/23/2016    Cataract Surgery    COLONOSCOPY  06/23/2016    Complete Colonoscopy    CT ABDOMEN PELVIS ANGIOGRAM W AND/OR WO IV CONTRAST  11/04/2020    CT ABDOMEN PELVIS ANGIOGRAM W AND/OR WO IV CONTRAST [facility]    HEMORRHOID SURGERY  06/23/2016    Hemorrhoidectomy    OTHER SURGICAL HISTORY  03/28/2022    Abdominal aortic aneurysm repair        reports that he has quit smoking. His smoking use included cigarettes. He has been exposed to tobacco smoke. He has never used smokeless tobacco. He reports that he does not currently use alcohol. He reports that he does not use drugs.    Review of Systems  Review of Systems   Eyes:  Positive for discharge and itching.   All other systems reviewed and  are negative.                                 Objective    Vitals:    02/18/25 1038   BP: 149/63   BP Location: Left arm   Patient Position: Sitting   BP Cuff Size: Adult long   Pulse: 55   Resp: 15   Temp: 36.2 °C (97.2 °F)   TempSrc: Temporal   SpO2: 97%   Weight: 109 kg (240 lb)     No LMP for male patient.    Physical Exam  Vitals and nursing note reviewed.   Eyes:      General: Lids are everted, no foreign bodies appreciated. Vision grossly intact. Gaze aligned appropriately.           Procedures    Point of Care Test & Imaging Results from this visit  No results found for this visit on 02/18/25.   No results found.    Diagnostic study results (if any) were reviewed by Crystal L Severino, APRN-CNP.    Assessment/Plan   Allergies, medications, history, and pertinent labs/EKGs/Imaging reviewed by Crystal L Severino, APRN-CNP.     Medical Decision Making  74-year-old male patient presents with chief complaint of waking up this morning with his right eye matted shut with yellow crustiness.  He states he used a warm washcloth to remove the drainage and noted that his eye was slightly red.  He states over the last few hours the redness to the conjunctiva seems to have lightened up.  Assessment is as above and is consistent with right eye conjunctivitis.  Patient will be treated with antibiotic eyedrops.  At time of discharge patient was clinically well-appearing and HDS for outpatient management. The patient and/or family was educated regarding diagnosis, supportive care, OTC and Rx medications. The patient and/or family was given the opportunity to ask questions prior to discharge.  They verbalized understanding of my discussion of the plans for treatment, expected course, indications to return to  or seek further evaluation in ED, and the need for timely follow up as directed.   They were provided with a work/school excuse if requested.      Orders and Diagnoses  Diagnoses and all orders for this visit:  Acute  conjunctivitis of right eye, unspecified acute conjunctivitis type  -     polymyxin B sulf-trimethoprim (Polytrim) ophthalmic solution; Administer 1 drop into the right eye every 4 hours for 10 days.  Use drops as directed  Warm compresses to the eye  Tylenol/ibuprofen as needed      Medical Admin Record      Patient disposition: Home    Electronically signed by Crystal L Severino, APRN-CNP  10:49 AM

## 2025-03-03 ENCOUNTER — ANTICOAGULATION - WARFARIN VISIT (OUTPATIENT)
Dept: CARDIOLOGY | Facility: CLINIC | Age: 75
End: 2025-03-03
Payer: MEDICARE

## 2025-03-03 DIAGNOSIS — I48.0 PAROXYSMAL ATRIAL FIBRILLATION (MULTI): Primary | ICD-10-CM

## 2025-03-03 LAB
POC INR: 2
POC PROTHROMBIN TIME: NORMAL

## 2025-03-03 PROCEDURE — 99211 OFF/OP EST MAY X REQ PHY/QHP: CPT

## 2025-03-03 PROCEDURE — 85610 PROTHROMBIN TIME: CPT | Mod: QW

## 2025-03-03 NOTE — PROGRESS NOTES
Patient identification verified with 2 identifiers.    Location: [facility] - [address] [place] [id number] [phone]     Referring Physician: Dr. Abi Gonsalves  Enrollment/ Re-enrollment date: 4/3/2025   INR Goal: 2.0-3.0  INR monitoring is per [facility] protocol.  Anticoagulation Medication: warfarin  Indication: Atrial Fibrillation/Atrial Flutter    Subjective   Bleeding signs/symptoms: No    Bruising: No   Major bleeding event: No  Thrombosis signs/symptoms: No  Thromboembolic event: No  Missed doses: No  Extra doses: No  Medication changes: No  Dietary changes: No  Change in health: No  Change in activity: No  Alcohol: No  Other concerns: No    Upcoming Procedures:  Does the Patient Have any upcoming procedures that require interruption in anticoagulation therapy? no  Does the patient require bridging? no      Anticoagulation Summary  As of 3/3/2025      INR goal:  2.0-3.0   TTR:  76.5% (10.1 mo)   INR used for dosin.00 (3/3/2025)   Weekly warfarin total:  12.5 mg               Assessment/Plan   Therapeutic     1. New dose: no change    2. Next INR: 1 month      Education provided to patient during the visit:  Patient instructed to call in interim with questions, concerns and changes.   Patient educated on interactions between medications and warfarin.   Patient educated on dietary consistency in vitamin k consumption.   Patient educated on affects of alcohol consumption while taking warfarin.   Patient educated on signs of bleeding/clotting.   Patient educated on compliance with dosing, follow up appointments, and prescribed plan of care.

## 2025-03-24 DIAGNOSIS — I48.19 PERSISTENT ATRIAL FIBRILLATION (MULTI): ICD-10-CM

## 2025-03-25 RX ORDER — WARFARIN 2.5 MG/1
TABLET ORAL
Qty: 90 TABLET | Refills: 3 | Status: SHIPPED | OUTPATIENT
Start: 2025-03-25

## 2025-03-25 NOTE — TELEPHONE ENCOUNTER
Spoke with patient and confirmed need for Warfarin refill.  Verified [facility] [address] in [facility] as pharmacy of choice and refill electronically submitted.

## 2025-03-31 ENCOUNTER — ANTICOAGULATION - WARFARIN VISIT (OUTPATIENT)
Dept: CARDIOLOGY | Facility: CLINIC | Age: 75
End: 2025-03-31
Payer: MEDICARE

## 2025-03-31 DIAGNOSIS — I48.0 PAROXYSMAL ATRIAL FIBRILLATION (MULTI): Primary | ICD-10-CM

## 2025-03-31 LAB
POC INR: 2.1
POC PROTHROMBIN TIME: NORMAL

## 2025-03-31 PROCEDURE — 85610 PROTHROMBIN TIME: CPT | Mod: QW

## 2025-03-31 PROCEDURE — 99211 OFF/OP EST MAY X REQ PHY/QHP: CPT

## 2025-03-31 NOTE — PROGRESS NOTES
Patient identification verified with 2 identifiers.    Location: [facility] - [address] [place] [id number] [phone]     Referring Physician: Dr. Abi Gonsalves  Enrollment/ Re-enrollment date: 4/3/2025   INR Goal: 2.0-3.0  INR monitoring is per [facility] protocol.  Anticoagulation Medication: warfarin  Indication: Atrial Fibrillation/Atrial Flutter    Subjective   Bleeding signs/symptoms: No    Bruising: No   Major bleeding event: No  Thrombosis signs/symptoms: No  Thromboembolic event: No  Missed doses: No  Extra doses: No  Medication changes: No  Dietary changes: No  Change in health: No  Change in activity: No  Alcohol: No  Other concerns: No    Upcoming Procedures:  Does the Patient Have any upcoming procedures that require interruption in anticoagulation therapy? no  Does the patient require bridging? no      Anticoagulation Summary  As of 3/31/2025      INR goal:  2.0-3.0   TTR:  78.5% (11.1 mo)   INR used for dosin.10 (3/31/2025)   Weekly warfarin total:  12.5 mg               Assessment/Plan   Therapeutic     1. New dose: no change    2. Next INR: 1 month      Education provided to patient during the visit:  Patient instructed to call in interim with questions, concerns and changes.   Patient educated on interactions between medications and warfarin.   Patient educated on dietary consistency in vitamin k consumption.   Patient educated on affects of alcohol consumption while taking warfarin.   Patient educated on signs of bleeding/clotting.   Patient educated on compliance with dosing, follow up appointments, and prescribed plan of care.

## 2025-04-26 LAB
ALBUMIN SERPL-MCNC: 4.1 G/DL (ref 3.6–5.1)
ALBUMIN/GLOB SERPL: 1.9 (CALC) (ref 1–2.5)
ALP SERPL-CCNC: 105 U/L (ref 35–144)
ALT SERPL-CCNC: 11 U/L (ref 9–46)
ANION GAP SERPL CALCULATED.4IONS-SCNC: 10 MMOL/L (CALC) (ref 7–17)
AST SERPL-CCNC: 13 U/L (ref 10–35)
BASOPHILS # BLD AUTO: 106 CELLS/UL (ref 0–200)
BASOPHILS NFR BLD AUTO: 1.4 %
BILIRUB DIRECT SERPL-MCNC: 0.2 MG/DL
BILIRUB INDIRECT SERPL-MCNC: 0.6 MG/DL (CALC) (ref 0.2–1.2)
BILIRUB SERPL-MCNC: 0.8 MG/DL (ref 0.2–1.2)
BUN SERPL-MCNC: 13 MG/DL (ref 7–25)
BUN/CREAT SERPL: NORMAL (CALC) (ref 6–22)
CALCIUM SERPL-MCNC: 8.7 MG/DL (ref 8.6–10.3)
CHLORIDE SERPL-SCNC: 103 MMOL/L (ref 98–110)
CHOLEST SERPL-MCNC: 126 MG/DL
CHOLEST/HDLC SERPL: 3.2 (CALC)
CO2 SERPL-SCNC: 25 MMOL/L (ref 20–32)
CREAT SERPL-MCNC: 1.12 MG/DL (ref 0.7–1.28)
EGFRCR SERPLBLD CKD-EPI 2021: 68 ML/MIN/1.73M2
EOSINOPHIL # BLD AUTO: 76 CELLS/UL (ref 15–500)
EOSINOPHIL NFR BLD AUTO: 1 %
ERYTHROCYTE [DISTWIDTH] IN BLOOD BY AUTOMATED COUNT: 13.8 % (ref 11–15)
EST. AVERAGE GLUCOSE BLD GHB EST-MCNC: 123 MG/DL
EST. AVERAGE GLUCOSE BLD GHB EST-SCNC: 6.8 MMOL/L
GLOBULIN SER CALC-MCNC: 2.2 G/DL (CALC) (ref 1.9–3.7)
GLUCOSE SERPL-MCNC: 95 MG/DL (ref 65–99)
HBA1C MFR BLD: 5.9 %
HCT VFR BLD AUTO: 40.8 % (ref 38.5–50)
HDLC SERPL-MCNC: 39 MG/DL
HGB BLD-MCNC: 13.2 G/DL (ref 13.2–17.1)
LDLC SERPL CALC-MCNC: 73 MG/DL (CALC)
LYMPHOCYTES # BLD AUTO: 676 CELLS/UL (ref 850–3900)
LYMPHOCYTES NFR BLD AUTO: 8.9 %
MAGNESIUM SERPL-MCNC: 2.1 MG/DL (ref 1.5–2.5)
MCH RBC QN AUTO: 29.4 PG (ref 27–33)
MCHC RBC AUTO-ENTMCNC: 32.4 G/DL (ref 32–36)
MCV RBC AUTO: 90.9 FL (ref 80–100)
MONOCYTES # BLD AUTO: 1034 CELLS/UL (ref 200–950)
MONOCYTES NFR BLD AUTO: 13.6 %
NEUTROPHILS # BLD AUTO: 5708 CELLS/UL (ref 1500–7800)
NEUTROPHILS NFR BLD AUTO: 75.1 %
NONHDLC SERPL-MCNC: 87 MG/DL (CALC)
PHOSPHATE SERPL-MCNC: 3.3 MG/DL (ref 2.1–4.3)
PLATELET # BLD AUTO: 178 THOUSAND/UL (ref 140–400)
PMV BLD REES-ECKER: 11.5 FL (ref 7.5–12.5)
POTASSIUM SERPL-SCNC: 4.4 MMOL/L (ref 3.5–5.3)
PROT SERPL-MCNC: 6.3 G/DL (ref 6.1–8.1)
RBC # BLD AUTO: 4.49 MILLION/UL (ref 4.2–5.8)
SODIUM SERPL-SCNC: 138 MMOL/L (ref 135–146)
T4 FREE SERPL-MCNC: 1.3 NG/DL (ref 0.8–1.8)
TRIGL SERPL-MCNC: 67 MG/DL
TSH SERPL-ACNC: 7.17 MIU/L (ref 0.4–4.5)
WBC # BLD AUTO: 7.6 THOUSAND/UL (ref 3.8–10.8)

## 2025-04-28 ENCOUNTER — ANTICOAGULATION - WARFARIN VISIT (OUTPATIENT)
Dept: CARDIOLOGY | Facility: CLINIC | Age: 75
End: 2025-04-28
Payer: MEDICARE

## 2025-04-28 DIAGNOSIS — I48.0 PAROXYSMAL ATRIAL FIBRILLATION (MULTI): Primary | ICD-10-CM

## 2025-04-28 LAB
POC INR: 2.2 (ref 0.9–1.1)
POC PROTHROMBIN TIME: ABNORMAL (ref 9.3–12.5)

## 2025-04-28 PROCEDURE — 99211 OFF/OP EST MAY X REQ PHY/QHP: CPT

## 2025-04-28 PROCEDURE — 85610 PROTHROMBIN TIME: CPT | Mod: QW

## 2025-04-28 NOTE — PROGRESS NOTES
Patient identification verified with 2 identifiers.    Location: [facility] - [address] [place] [address] [phone]     Referring Physician: Dr. Abi Gonsalves  Enrollment/ Re-enrollment date: 4/3/2025.  Renewal sent electronically on 25.  INR Goal: 2.0-3.0  INR monitoring is per [facility] protocol.  Anticoagulation Medication: warfarin  Indication: Atrial Fibrillation/Atrial Flutter    Subjective   Bleeding signs/symptoms: No    Bruising: No   Major bleeding event: No  Thrombosis signs/symptoms: No  Thromboembolic event: No  Missed doses: No  Extra doses: No  Medication changes: No  Dietary changes: No  Change in health: No  Change in activity: No  Alcohol: No  Other concerns: No    Upcoming Procedures:  Does the Patient Have any upcoming procedures that require interruption in anticoagulation therapy? no  Does the patient require bridging? no      Anticoagulation Summary  As of 2025      INR goal:  2.0-3.0   TTR:  80.2% (1 y)   INR used for dosin.20 (2025)   Weekly warfarin total:  12.5 mg               Assessment/Plan   Therapeutic     1. New dose: no change    2. Next INR: 1 month      Education provided to patient during the visit:  Patient instructed to call in interim with questions, concerns and changes.   Patient educated on interactions between medications and warfarin.   Patient educated on dietary consistency in vitamin k consumption.   Patient educated on affects of alcohol consumption while taking warfarin.   Patient educated on signs of bleeding/clotting.   Patient educated on compliance with dosing, follow up appointments, and prescribed plan of care.

## 2025-04-29 PROBLEM — I48.0 PAROXYSMAL ATRIAL FIBRILLATION (MULTI): Status: ACTIVE | Noted: 2025-04-29

## 2025-04-30 ENCOUNTER — OFFICE VISIT (OUTPATIENT)
Dept: PRIMARY CARE | Facility: CLINIC | Age: 75
End: 2025-04-30
Payer: MEDICARE

## 2025-04-30 VITALS
SYSTOLIC BLOOD PRESSURE: 122 MMHG | HEART RATE: 55 BPM | HEIGHT: 70 IN | OXYGEN SATURATION: 98 % | WEIGHT: 240 LBS | BODY MASS INDEX: 34.36 KG/M2 | DIASTOLIC BLOOD PRESSURE: 70 MMHG

## 2025-04-30 DIAGNOSIS — I10 PRIMARY HYPERTENSION: ICD-10-CM

## 2025-04-30 DIAGNOSIS — G47.33 OSA (OBSTRUCTIVE SLEEP APNEA): ICD-10-CM

## 2025-04-30 DIAGNOSIS — Z01.89 ENCOUNTER FOR ROUTINE LABORATORY TESTING: ICD-10-CM

## 2025-04-30 DIAGNOSIS — N18.31 STAGE 3A CHRONIC KIDNEY DISEASE (MULTI): ICD-10-CM

## 2025-04-30 DIAGNOSIS — E78.2 MIXED HYPERLIPIDEMIA: ICD-10-CM

## 2025-04-30 DIAGNOSIS — E55.9 VITAMIN D DEFICIENCY: ICD-10-CM

## 2025-04-30 DIAGNOSIS — E03.8 SUBCLINICAL HYPOTHYROIDISM: ICD-10-CM

## 2025-04-30 DIAGNOSIS — N40.0 BENIGN PROSTATIC HYPERPLASIA WITHOUT LOWER URINARY TRACT SYMPTOMS: ICD-10-CM

## 2025-04-30 DIAGNOSIS — Z00.00 ANNUAL PHYSICAL EXAM: ICD-10-CM

## 2025-04-30 DIAGNOSIS — M15.0 PRIMARY OSTEOARTHRITIS INVOLVING MULTIPLE JOINTS: ICD-10-CM

## 2025-04-30 DIAGNOSIS — I48.3 TYPICAL ATRIAL FLUTTER (MULTI): ICD-10-CM

## 2025-04-30 DIAGNOSIS — R73.03 PREDIABETES: ICD-10-CM

## 2025-04-30 DIAGNOSIS — E66.811 CLASS 1 OBESITY WITHOUT SERIOUS COMORBIDITY WITH BODY MASS INDEX (BMI) OF 34.0 TO 34.9 IN ADULT, UNSPECIFIED OBESITY TYPE: Primary | ICD-10-CM

## 2025-04-30 DIAGNOSIS — I48.0 PAROXYSMAL ATRIAL FIBRILLATION (MULTI): ICD-10-CM

## 2025-04-30 DIAGNOSIS — I27.20 PULMONARY HYPERTENSION (MULTI): ICD-10-CM

## 2025-04-30 PROCEDURE — G2211 COMPLEX E/M VISIT ADD ON: HCPCS | Performed by: STUDENT IN AN ORGANIZED HEALTH CARE EDUCATION/TRAINING PROGRAM

## 2025-04-30 PROCEDURE — 1160F RVW MEDS BY RX/DR IN RCRD: CPT | Performed by: STUDENT IN AN ORGANIZED HEALTH CARE EDUCATION/TRAINING PROGRAM

## 2025-04-30 PROCEDURE — 99214 OFFICE O/P EST MOD 30 MIN: CPT | Performed by: STUDENT IN AN ORGANIZED HEALTH CARE EDUCATION/TRAINING PROGRAM

## 2025-04-30 PROCEDURE — 1036F TOBACCO NON-USER: CPT | Performed by: STUDENT IN AN ORGANIZED HEALTH CARE EDUCATION/TRAINING PROGRAM

## 2025-04-30 PROCEDURE — 1159F MED LIST DOCD IN RCRD: CPT | Performed by: STUDENT IN AN ORGANIZED HEALTH CARE EDUCATION/TRAINING PROGRAM

## 2025-04-30 PROCEDURE — 3078F DIAST BP <80 MM HG: CPT | Performed by: STUDENT IN AN ORGANIZED HEALTH CARE EDUCATION/TRAINING PROGRAM

## 2025-04-30 PROCEDURE — 1157F ADVNC CARE PLAN IN RCRD: CPT | Performed by: STUDENT IN AN ORGANIZED HEALTH CARE EDUCATION/TRAINING PROGRAM

## 2025-04-30 PROCEDURE — 1126F AMNT PAIN NOTED NONE PRSNT: CPT | Performed by: STUDENT IN AN ORGANIZED HEALTH CARE EDUCATION/TRAINING PROGRAM

## 2025-04-30 PROCEDURE — 3074F SYST BP LT 130 MM HG: CPT | Performed by: STUDENT IN AN ORGANIZED HEALTH CARE EDUCATION/TRAINING PROGRAM

## 2025-04-30 RX ORDER — METOPROLOL TARTRATE 100 MG/1
50 TABLET ORAL 2 TIMES DAILY
Status: SHIPPED
Start: 2025-04-30

## 2025-04-30 RX ORDER — ATORVASTATIN CALCIUM 80 MG/1
80 TABLET, FILM COATED ORAL DAILY
Qty: 90 TABLET | Refills: 3 | Status: SHIPPED | OUTPATIENT
Start: 2025-04-30

## 2025-04-30 ASSESSMENT — PAIN SCALES - GENERAL: PAINLEVEL_OUTOF10: 0-NO PAIN

## 2025-04-30 NOTE — PATIENT INSTRUCTIONS
- Overall, the patient feels well and denies any acute symptoms / concerns at this time.  - Blood pressure at goal today.  - Encouraged continued dietary, exercise, and lifestyle modification.  - Encouraged continued use of CPAP.  - Significant medication and problem list reconciliation done today.     - Labwork:   - Patient had labwork done for this appointment. Discussed today.   - New diagnosis of prediabetes. Mild. Does not require treatment at this time.  - Everything else looked stable / unremarkable.   - Will order labwork for the patient's next appointment. Encouraged the patient to get this labwork done one week prior to the next appointment.

## 2025-04-30 NOTE — PROGRESS NOTES
[facility]: [facility]  PROGRESS NOTE      Anderson Barlow is a 74 y.o. male that is presenting today for Follow-up.    Assessment/Plan   - Overall, the patient feels well and denies any acute symptoms / concerns at this time.  - Blood pressure at goal today.  - Encouraged continued dietary, exercise, and lifestyle modification.  - Encouraged continued use of CPAP.  - Significant medication and problem list reconciliation done today.     - Labwork:   - Patient had labwork done for this appointment. Discussed today.   - New diagnosis of prediabetes. Mild. Does not require treatment at this time.  - Everything else looked stable / unremarkable.   - Will order labwork for the patient's next appointment. Encouraged the patient to get this labwork done one week prior to the next appointment.    Diagnoses and all orders for this visit:  Class 1 obesity without serious comorbidity with body mass index (BMI) of 34.0 to 34.9 in adult, unspecified obesity type  -     Follow Up In Primary Care  Benign prostatic hyperplasia without lower urinary tract symptoms  -     Follow Up In Primary Care  Primary osteoarthritis involving multiple joints  -     Follow Up In Primary Care  Stage 3a chronic kidney disease (Multi)  -     Follow Up In Primary Care  -     Comprehensive Metabolic Panel; Future  -     CBC and Auto Differential; Future  Paroxysmal atrial fibrillation (Multi)  -     Follow Up In Primary Care  -     metoprolol tartrate (Lopressor) 100 mg tablet; Take 0.5 tablets (50 mg) by mouth 2 times a day.  Pulmonary hypertension (Multi)  -     Follow Up In Primary Care  JAYDEN (obstructive sleep apnea)  -     Follow Up In Primary Care  Subclinical hypothyroidism  -     Follow Up In Primary Care  Typical atrial flutter (Multi)  -     Follow Up In Primary Care  -     metoprolol tartrate (Lopressor) 100 mg tablet; Take 0.5 tablets (50 mg) by mouth 2 times a day.  Mixed hyperlipidemia  -     Follow Up In Primary  Care  Primary hypertension  -     Follow Up In Primary Care  -     metoprolol tartrate (Lopressor) 100 mg tablet; Take 0.5 tablets (50 mg) by mouth 2 times a day.  -     Comprehensive Metabolic Panel; Future  -     CBC and Auto Differential; Future  Vitamin D deficiency  -     Follow Up In Primary Care  Prediabetes  Encounter for routine laboratory testing  Annual physical exam  PVC (premature ventricular contraction)  -     metoprolol tartrate (Lopressor) 100 mg tablet; Take 0.5 tablets (50 mg) by mouth 2 times a day.    Current Outpatient Medications   Medication Instructions    acetaminophen (TYLENOL EXTRA STRENGTH) 500-1,000 mg, oral, 3 times daily PRN    amiodarone (Pacerone) 200 mg tablet TAKE 1 TABLET (200 MG) BY MOUTH 4 TIMES A WEEK AND 0.5 TABLETS (100 MG) 3 TIMES A WEEK.    atorvastatin (LIPITOR) 80 mg, oral, Daily    cholecalciferol (VITAMIN D3) 50 mcg, oral, Daily    ketoconazole (NIZOral) 2 % cream Daily PRN    losartan (COZAAR) 50 mg, oral, Daily    metoprolol tartrate (LOPRESSOR) 50 mg, oral, 2 times daily    triamcinolone (Kenalog) 0.1 % cream APPLY TWICE DAILY TO RASH UP TO 2 WEEKS/MONTH AS NEEDED.    warfarin (Coumadin) 2.5 mg tablet TAKE AS DIRECTED PER COUMADIN CLINIC ON YOUR AFTER VISIT SUMMARY     Subjective   HPI  Review of Systems   Objective   Vitals:    04/30/25 1006   BP: 122/70   Pulse: 55   SpO2: 98%      Body mass index is 34.44 kg/m².  Physical Exam  Diagnostic Results   Lab Results   Component Value Date    GLUCOSE 95 04/25/2025    CALCIUM 8.7 04/25/2025     04/25/2025    K 4.4 04/25/2025    CO2 25 04/25/2025     04/25/2025    BUN 13 04/25/2025    CREATININE 1.12 04/25/2025     Lab Results   Component Value Date    ALT 11 04/25/2025    AST 13 04/25/2025    ALKPHOS 105 04/25/2025    BILITOT 0.8 04/25/2025     Lab Results   Component Value Date    WBC 7.6 04/25/2025    HGB 13.2 04/25/2025    HCT 40.8 04/25/2025    MCV 90.9 04/25/2025     04/25/2025     Lab Results  "  Component Value Date    CHOL 126 04/25/2025    CHOL 201 (H) 04/09/2024    CHOL 158 03/03/2021     Lab Results   Component Value Date    HDL 39 (L) 04/25/2025    HDL 41.0 04/09/2024    HDL 37 (L) 03/03/2021     Lab Results   Component Value Date    LDLCALC 73 04/25/2025    LDLCALC 133 (H) 04/09/2024    LDLCALC 97 03/03/2021     Lab Results   Component Value Date    TRIG 67 04/25/2025    TRIG 134 04/09/2024    TRIG 122 03/03/2021     No components found for: \"CHOLHDL\"  Lab Results   Component Value Date    HGBA1C 5.9 (H) 04/25/2025     Other labs not included in the list above were reviewed either before or during this encounter.    History    Medical History[1]  Surgical History[2]  Family History[3]  Social History     Socioeconomic History    Marital status:      Spouse name: Not on file    Number of children: Not on file    Years of education: Not on file    Highest education level: Not on file   Occupational History    Not on file   Tobacco Use    Smoking status: Former     Types: Cigarettes     Passive exposure: Past    Smokeless tobacco: Never    Tobacco comments:     Pt quit about 10 years ago   Vaping Use    Vaping status: Never Used   Substance and Sexual Activity    Alcohol use: Not Currently    Drug use: Never    Sexual activity: Defer     Partners: Female   Other Topics Concern    Not on file   Social History Narrative    Not on file     Social Drivers of Health     Financial Resource Strain: Not on file   Food Insecurity: Not on file   Transportation Needs: Not on file   Physical Activity: Not on file   Stress: Not on file   Social Connections: Not on file   Intimate Partner Violence: Not on file   Housing Stability: Not on file     Allergies[4]  Medications Ordered Prior to Encounter[5]  Immunization History   Administered Date(s) Administered    COVID-19, mRNA, LNP-S, PF, 30 mcg/0.3 mL dose 03/05/2021, 04/02/2021, 01/20/2022    Flu vaccine, quadrivalent, high-dose, preservative free, age 65y+ " "(FLUZONE) 10/22/2021, 10/27/2023    Flu vaccine, trivalent, preservative free, HIGH-DOSE, age 65y+ (Fluzone) 10/19/2016, 02/09/2017, 11/05/2019    Influenza, Seasonal, Quadrivalent, Adjuvanted 10/13/2020    Influenza, seasonal, injectable 11/24/2007, 09/03/2009, 11/11/2010, 10/31/2011, 09/25/2012, 10/23/2013, 11/12/2014, 11/12/2015, 10/24/2016    Influenza, trivalent, adjuvanted 11/20/2017, 10/29/2018    Pfizer Purple Cap SARS-CoV-2 01/01/2022    Pneumococcal conjugate vaccine, 13-valent (PREVNAR 13) 08/17/2015    Pneumococcal polysaccharide vaccine, 23-valent, age 2 years and older (PNEUMOVAX 23) 10/23/2013    Td vaccine, age 7 years and older (TDVAX) 12/30/1994    Tdap vaccine, age 7 year and older (BOOSTRIX, ADACEL) 09/06/2012, 01/01/2014, 11/29/2017    Zoster, live 09/03/2009     Patient's medical history was reviewed and updated either before or during this encounter.       Vazquez Vu MD       [1]   Past Medical History:  Diagnosis Date    Aneurysm     Atrial fibrillation (Multi)     Clotting disorder (Multi)     Hypertension    [2]   Past Surgical History:  Procedure Laterality Date    ARTERIAL ANEURYSM REPAIR      CATARACT EXTRACTION  06/23/2016    Cataract Surgery    COLONOSCOPY  06/23/2016    Complete Colonoscopy    CT ABDOMEN PELVIS ANGIOGRAM W AND/OR WO IV CONTRAST  11/04/2020    CT ABDOMEN PELVIS ANGIOGRAM W AND/OR WO IV CONTRAST [facility]    HEMORRHOID SURGERY  06/23/2016    Hemorrhoidectomy    OTHER SURGICAL HISTORY  03/28/2022    Abdominal aortic aneurysm repair   [3]   Family History  Problem Relation Name Age of Onset    Alzheimer's disease Mother      Liver cancer Other SIBLINGS    [4]   Allergies  Allergen Reactions    Codeine Unknown    Hydrocodone Other     MENTAL STATUS CHANGE    Hydrocodone-Acetaminophen Other     [facility]    Lisinopril Other and Unknown     CHOKING    Oxycodone-Acetaminophen Unknown    Succinylcholine Other and Unknown     \"CAUSED FATHER'S PROSTATE CANCER\"    " Cephalexin Diarrhea and Palpitations     VOMITING  HEART RACING  FACE FLUSHING    Penicillins Rash   [5]   Current Outpatient Medications on File Prior to Visit   Medication Sig Dispense Refill    acetaminophen (Tylenol Extra Strength) 500 mg tablet Take 1-2 tablets (500-1,000 mg) by mouth 3 times a day as needed for mild pain (1 - 3), moderate pain (4 - 6) or fever (temp greater than 38.0 C).      amiodarone (Pacerone) 200 mg tablet TAKE 1 TABLET (200 MG) BY MOUTH 4 TIMES A WEEK AND 0.5 TABLETS (100 MG) 3 TIMES A WEEK. 198 tablet 1    atorvastatin (Lipitor) 80 mg tablet Take 1 tablet (80 mg) by mouth once daily. 30 tablet 11    cholecalciferol (Vitamin D3) 50 mcg (2,000 unit) capsule Take 1 capsule (50 mcg) by mouth once daily.      ketoconazole (NIZOral) 2 % cream Apply topically once daily as needed.      losartan (Cozaar) 50 mg tablet Take 1 tablet (50 mg) by mouth once daily. 90 tablet 3    triamcinolone (Kenalog) 0.1 % cream APPLY TWICE DAILY TO RASH UP TO 2 WEEKS/MONTH AS NEEDED.      warfarin (Coumadin) 2.5 mg tablet TAKE AS DIRECTED PER COUMADIN CLINIC ON YOUR AFTER VISIT SUMMARY 90 tablet 3    [DISCONTINUED] metoprolol tartrate (Lopressor) 100 mg tablet Take 0.5 tablets by mouth 3 times a day. (Patient taking differently: Take 0.5 tablets (50 mg) by mouth 2 times a day.) 150 tablet 3     No current facility-administered medications on file prior to visit.

## 2025-06-02 ENCOUNTER — ANTICOAGULATION - WARFARIN VISIT (OUTPATIENT)
Dept: CARDIOLOGY | Facility: CLINIC | Age: 75
End: 2025-06-02
Payer: MEDICARE

## 2025-06-02 DIAGNOSIS — I48.0 PAROXYSMAL ATRIAL FIBRILLATION (MULTI): Primary | ICD-10-CM

## 2025-06-02 LAB
POC INR: 2.2 (ref 0.9–1.1)
POC PROTHROMBIN TIME: ABNORMAL (ref 9.3–12.5)

## 2025-06-02 PROCEDURE — 99211 OFF/OP EST MAY X REQ PHY/QHP: CPT

## 2025-06-02 PROCEDURE — 85610 PROTHROMBIN TIME: CPT | Mod: QW

## 2025-06-02 NOTE — PROGRESS NOTES
Patient identification verified with 2 identifiers.    Location: [facility] - [address] [place] [id number] [phone]     Referring Physician: Dr. Abi Gonsalves  Enrollment/ Re-enrollment date: 2026   INR Goal: 2.0-3.0  INR monitoring is per [facility] protocol.  Anticoagulation Medication: warfarin  Indication: Atrial Fibrillation/Atrial Flutter    Subjective   Bleeding signs/symptoms: No    Bruising: No   Major bleeding event: No  Thrombosis signs/symptoms: No  Thromboembolic event: No  Missed doses: No  Extra doses: No  Medication changes: No  Dietary changes: No  Change in health: No  Change in activity: No  Alcohol: No  Other concerns: No    Upcoming Procedures:  Does the Patient Have any upcoming procedures that require interruption in anticoagulation therapy? no  Does the patient require bridging? no      Anticoagulation Summary  As of 2025      INR goal:  2.0-3.0   TTR:  81.9% (1.1 y)   INR used for dosin.20 (2025)   Weekly warfarin total:  12.5 mg               Assessment/Plan   Therapeutic     1. New dose: no change    2. Next INR: 1 month      Education provided to patient during the visit:  Patient instructed to call in interim with questions, concerns and changes.   Patient educated on interactions between medications and warfarin.   Patient educated on dietary consistency in vitamin k consumption.   Patient educated on affects of alcohol consumption while taking warfarin.   Patient educated on signs of bleeding/clotting.   Patient educated on compliance with dosing, follow up appointments, and prescribed plan of care.

## 2025-06-25 ENCOUNTER — TELEPHONE (OUTPATIENT)
Dept: CARDIOLOGY | Facility: CLINIC | Age: 75
End: 2025-06-25
Payer: MEDICARE

## 2025-06-25 NOTE — TELEPHONE ENCOUNTER
Pt left VM 6/25 cancelling 6/30 appt due to conflict with wife's appt.  Pt states he will contact clinic when he is able to reschedule.

## 2025-06-30 ENCOUNTER — APPOINTMENT (OUTPATIENT)
Dept: CARDIOLOGY | Facility: CLINIC | Age: 75
End: 2025-06-30
Payer: MEDICARE

## 2025-07-07 ENCOUNTER — ANTICOAGULATION - WARFARIN VISIT (OUTPATIENT)
Dept: CARDIOLOGY | Facility: CLINIC | Age: 75
End: 2025-07-07
Payer: MEDICARE

## 2025-07-07 DIAGNOSIS — I48.0 PAROXYSMAL ATRIAL FIBRILLATION (MULTI): Primary | ICD-10-CM

## 2025-07-07 LAB
POC INR: 2.6 (ref 0.9–1.1)
POC PROTHROMBIN TIME: ABNORMAL (ref 9.3–12.5)

## 2025-07-07 PROCEDURE — 85610 PROTHROMBIN TIME: CPT | Mod: QW

## 2025-07-07 PROCEDURE — 99211 OFF/OP EST MAY X REQ PHY/QHP: CPT

## 2025-07-07 NOTE — PROGRESS NOTES
Patient identification verified with 2 identifiers.    Location: [facility] - [address] [place] [address] [phone]     Referring Physician: Dr. Eloina Gonsalves  Enrollment/ Re-enrollment date: 2026   INR Goal: 2.0-3.0  INR monitoring is per [facility] protocol.  Anticoagulation Medication: warfarin  Indication: Atrial Fibrillation/Atrial Flutter    Subjective   Bleeding signs/symptoms: No    Bruising: No   Major bleeding event: No  Thrombosis signs/symptoms: No  Thromboembolic event: No  Missed doses: No  Extra doses: No  Medication changes: No  Dietary changes: No  Change in health: No  Change in activity: No  Alcohol: No  Other concerns: No    Upcoming Procedures:  Does the Patient Have any upcoming procedures that require interruption in anticoagulation therapy? no  Does the patient require bridging? no      Anticoagulation Summary  As of 2025      INR goal:  2.0-3.0   TTR:  83.4% (1.2 y)   INR used for dosin.60 (2025)   Weekly warfarin total:  12.5 mg               Assessment/Plan   Therapeutic     1. New dose: no change    2. Next INR: 1 month      Education provided to patient during the visit:  Patient instructed to call in interim with questions, concerns and changes.   Patient educated on compliance with dosing, follow up appointments, and prescribed plan of care.

## 2025-07-22 ENCOUNTER — TELEPHONE (OUTPATIENT)
Dept: CARDIOLOGY | Facility: CLINIC | Age: 75
End: 2025-07-22
Payer: MEDICARE

## 2025-07-22 ENCOUNTER — OFFICE VISIT (OUTPATIENT)
Dept: URGENT CARE | Age: 75
End: 2025-07-22
Payer: MEDICARE

## 2025-07-22 VITALS
TEMPERATURE: 97.7 F | HEART RATE: 59 BPM | DIASTOLIC BLOOD PRESSURE: 89 MMHG | SYSTOLIC BLOOD PRESSURE: 169 MMHG | RESPIRATION RATE: 16 BRPM | WEIGHT: 225 LBS | BODY MASS INDEX: 32.28 KG/M2 | OXYGEN SATURATION: 95 %

## 2025-07-22 DIAGNOSIS — N30.01 ACUTE CYSTITIS WITH HEMATURIA: Primary | ICD-10-CM

## 2025-07-22 DIAGNOSIS — M54.9 BACK PAIN, UNSPECIFIED BACK LOCATION, UNSPECIFIED BACK PAIN LATERALITY, UNSPECIFIED CHRONICITY: ICD-10-CM

## 2025-07-22 LAB
POC APPEARANCE, URINE: CLEAR
POC BILIRUBIN, URINE: NEGATIVE
POC BLOOD, URINE: ABNORMAL
POC COLOR, URINE: YELLOW
POC GLUCOSE, URINE: NEGATIVE MG/DL
POC KETONES, URINE: NEGATIVE MG/DL
POC LEUKOCYTES, URINE: NEGATIVE
POC NITRITE,URINE: NEGATIVE
POC PH, URINE: 6 PH
POC PROTEIN, URINE: NEGATIVE MG/DL
POC SPECIFIC GRAVITY, URINE: 1.01
POC UROBILINOGEN, URINE: 0.2 EU/DL

## 2025-07-22 RX ORDER — SULFAMETHOXAZOLE AND TRIMETHOPRIM 800; 160 MG/1; MG/1
1 TABLET ORAL 2 TIMES DAILY
Qty: 14 TABLET | Refills: 0 | Status: SHIPPED | OUTPATIENT
Start: 2025-07-22

## 2025-07-22 ASSESSMENT — ENCOUNTER SYMPTOMS
FLANK PAIN: 1
NAUSEA: 1
HEMATURIA: 1
BACK PAIN: 1

## 2025-07-22 NOTE — TELEPHONE ENCOUNTER
Spoke with patient status post receipt of his message informing us he was started on an antibiotic and he is on Coumadin. Instructed patient to call [facility] today and leave a voice mail message with medication detail since they will probably need to adjust his INR checks. Patient verbalized understanding.

## 2025-07-22 NOTE — PROGRESS NOTES
Subjective   Patient ID: Anderson Barlow is a 74 y.o. male. They present today with a chief complaint of Back Pain (Nausea for 2 weeks- wife had UTI and wants urine checked).    History of Present Illness  Patient 74-year-old male presenting complaining of right-sided flank pain and nausea as well as back pain which has been chronic for the patient.  Patient has his wife in a nursing home who has UTI currently and he was advised by the staff to get checked out for his symptoms as well.  He denies chills, fever, abdominal pain, vomiting, or diarrhea.    Past Medical History  Allergies as of 07/22/2025 - Reviewed 07/22/2025   Allergen Reaction Noted    Codeine Unknown 08/26/2023    Hydrocodone Other 08/26/2023    Hydrocodone-acetaminophen Other 08/26/2023    Lisinopril Other and Unknown 08/26/2023    Oxycodone-acetaminophen Unknown 08/26/2023    Succinylcholine Other and Unknown 08/26/2023    Cephalexin Diarrhea and Palpitations 08/26/2023    Penicillins Rash 08/26/2023       Prescriptions Prior to Admission[1]     Medical History[2]    Surgical History[3]     reports that he has quit smoking. His smoking use included cigarettes. He has been exposed to tobacco smoke. He has never used smokeless tobacco. He reports that he does not currently use alcohol. He reports that he does not use drugs.    Review of Systems  Review of Systems   Gastrointestinal:  Positive for nausea.   Genitourinary:  Positive for flank pain and hematuria.   Musculoskeletal:  Positive for back pain.   All other systems reviewed and are negative.                                 Objective    Vitals:    07/22/25 0807   BP: 169/89   BP Location: Left arm   Patient Position: Sitting   BP Cuff Size: Adult   Pulse: 59   Resp: 16   Temp: 36.5 °C (97.7 °F)   TempSrc: Oral   SpO2: 95%   Weight: 102 kg (225 lb)     No LMP for male patient.    Physical Exam  Vitals reviewed.   General: Vitals Noted. No distress. Normocephalic.  Cardiovascular:     Heart  sounds: Normal heart sounds, S1 normal and S2 normal. No murmur heard.     No friction rub.   Pulmonary:      Effort: Pulmonary effort is normal.      Breath sounds:  Lungs clear to auscultation bilaterally   Neck: Supple with no adenopathy.  Lymphadenopathy:   No cervical adenopathy on palpation  Lower Body: No right inguinal adenopathy. No left inguinal adenopathy.   Abdominal:      Palpations: There is no hepatomegaly, splenomegaly or mass. Abdomen is soft, non-tender, and non-distended. No suprapubic tenderness. No CVA tenderness.   Skin:     Comments: no rash   Neurological:      Cranial Nerves: Cranial nerves 2-12 are intact.      Sensory: No sensory deficit.      Motor: Motor function is intact.      Deep Tendon Reflexes: Reflexes are normal and symmetric.       Procedures    Point of Care Test & Imaging Results from this visit  Results for orders placed or performed in visit on 07/22/25   POCT UA Automated manually resulted   Result Value Ref Range    POC Color, Urine Yellow Straw, Yellow, Light-Yellow    POC Appearance, Urine Clear Clear    POC Glucose, Urine NEGATIVE NEGATIVE mg/dl    POC Bilirubin, Urine NEGATIVE NEGATIVE    POC Ketones, Urine NEGATIVE NEGATIVE mg/dl    POC Specific Gravity, Urine 1.015 1.005 - 1.035    POC Blood, Urine TRACE-Intact (A) NEGATIVE    POC PH, Urine 6.0 No Reference Range Established PH    POC Protein, Urine NEGATIVE NEGATIVE mg/dl    POC Urobilinogen, Urine 0.2 0.2, 1.0 EU/DL    Poc Nitrite, Urine NEGATIVE NEGATIVE    POC Leukocytes, Urine NEGATIVE NEGATIVE      Imaging  No results found.    Cardiology, Vascular, and Other Imaging  No other imaging results found for the past 2 days      Diagnostic study results (if any) were reviewed by KINA Haddad.    Assessment/Plan   Allergies, medications, history, and pertinent labs/EKGs/Imaging reviewed by KINA Haddad.     Medical Decision Making  On exam patient is non toxic, in no distress. Testing for UA in  the office is positive trace of blood, UC sent out. On examination as above, no concerns for appendicitis given presentation of  symptoms and examination. Normal bowel sounds, Lungs are clean on auscultation, normal heart sounds. No CVA tenderness. Bactrim as prescribed, advised to monitor Warfarin levels. Will adjust treatment once cultures return, as needed.Will be discharged with instructions to increase fluid intake, use of OTC for symptoms relief, avoid NSIADS. Advised to follow up with PCP for further management. Advised to return if symptoms worsen and needs to be reevaluated. Reviewed with patient signs and symptoms significant to present to ED. Patient verbalized understanding and agreed with the plan.       Orders and Diagnoses  Diagnoses and all orders for this visit:  Acute cystitis with hematuria  -     sulfamethoxazole-trimethoprim (Bactrim DS) 800-160 mg tablet; Take 1 tablet by mouth 2 times a day.  -     Urine Culture  Back pain, unspecified back location, unspecified back pain laterality, unspecified chronicity  -     POCT UA Automated manually resulted  -     Urine Culture      Medical Admin Record      Patient disposition: Home    Electronically signed by KINA Haddad  8:51 AM           [1] (Not in a hospital admission)   [2]   Past Medical History:  Diagnosis Date    Aneurysm     Atrial fibrillation (Multi)     Clotting disorder (Multi)     Hypertension    [3]   Past Surgical History:  Procedure Laterality Date    ARTERIAL ANEURYSM REPAIR      CATARACT EXTRACTION  06/23/2016    Cataract Surgery    COLONOSCOPY  06/23/2016    Complete Colonoscopy    CT ABDOMEN PELVIS ANGIOGRAM W AND/OR WO IV CONTRAST  11/04/2020    CT ABDOMEN PELVIS ANGIOGRAM W AND/OR WO IV CONTRAST [facility]    HEMORRHOID SURGERY  06/23/2016    Hemorrhoidectomy    OTHER SURGICAL HISTORY  03/28/2022    Abdominal aortic aneurysm repair

## 2025-07-23 ENCOUNTER — HOSPITAL ENCOUNTER (EMERGENCY)
Facility: HOSPITAL | Age: 75
Discharge: LEFT BEFORE DISCHARGE | End: 2025-07-23
Attending: EMERGENCY MEDICINE
Payer: MEDICARE

## 2025-07-23 ENCOUNTER — APPOINTMENT (OUTPATIENT)
Dept: RADIOLOGY | Facility: HOSPITAL | Age: 75
End: 2025-07-23
Payer: MEDICARE

## 2025-07-23 ENCOUNTER — APPOINTMENT (OUTPATIENT)
Dept: CARDIOLOGY | Facility: HOSPITAL | Age: 75
End: 2025-07-23
Payer: MEDICARE

## 2025-07-23 VITALS
TEMPERATURE: 98.1 F | DIASTOLIC BLOOD PRESSURE: 72 MMHG | RESPIRATION RATE: 18 BRPM | WEIGHT: 225 LBS | BODY MASS INDEX: 32.21 KG/M2 | HEIGHT: 70 IN | OXYGEN SATURATION: 98 % | HEART RATE: 70 BPM | SYSTOLIC BLOOD PRESSURE: 128 MMHG

## 2025-07-23 DIAGNOSIS — R53.1 WEAKNESS: Primary | ICD-10-CM

## 2025-07-23 DIAGNOSIS — T50.905A ADVERSE EFFECT OF DRUG, INITIAL ENCOUNTER: ICD-10-CM

## 2025-07-23 DIAGNOSIS — I77.6 AORTITIS: ICD-10-CM

## 2025-07-23 LAB
ABO GROUP (TYPE) IN BLOOD: NORMAL
ABO GROUP (TYPE) IN BLOOD: NORMAL
ANION GAP BLDV CALCULATED.4IONS-SCNC: 6 MMOL/L (ref 10–25)
ANION GAP SERPL CALCULATED.3IONS-SCNC: 11 MMOL/L (ref 10–20)
ANTIBODY SCREEN: NORMAL
ATRIAL RATE: 62 BPM
BASE EXCESS BLDV CALC-SCNC: 3.9 MMOL/L (ref -2–3)
BASOPHILS # BLD AUTO: 0.07 X10*3/UL (ref 0–0.1)
BASOPHILS NFR BLD AUTO: 0.9 %
BODY TEMPERATURE: 37 DEGREES CELSIUS
BUN SERPL-MCNC: 11 MG/DL (ref 6–23)
CA-I BLDV-SCNC: 1.17 MMOL/L (ref 1.1–1.33)
CALCIUM SERPL-MCNC: 8.4 MG/DL (ref 8.6–10.3)
CARDIAC TROPONIN I PNL SERPL HS: 7 NG/L (ref 0–20)
CARDIAC TROPONIN I PNL SERPL HS: 7 NG/L (ref 0–20)
CHLORIDE BLDV-SCNC: 101 MMOL/L (ref 98–107)
CHLORIDE SERPL-SCNC: 102 MMOL/L (ref 98–107)
CO2 SERPL-SCNC: 26 MMOL/L (ref 21–32)
CREAT SERPL-MCNC: 1.13 MG/DL (ref 0.5–1.3)
EGFRCR SERPLBLD CKD-EPI 2021: 67 ML/MIN/1.73M*2
EOSINOPHIL # BLD AUTO: 0.03 X10*3/UL (ref 0–0.4)
EOSINOPHIL NFR BLD AUTO: 0.4 %
ERYTHROCYTE [DISTWIDTH] IN BLOOD BY AUTOMATED COUNT: 15.6 % (ref 11.5–14.5)
GLUCOSE BLDV-MCNC: 109 MG/DL (ref 74–99)
GLUCOSE SERPL-MCNC: 108 MG/DL (ref 74–99)
HCO3 BLDV-SCNC: 28.5 MMOL/L (ref 22–26)
HCT VFR BLD AUTO: 38.7 % (ref 41–52)
HCT VFR BLD EST: 38 % (ref 41–52)
HGB BLD-MCNC: 12.2 G/DL (ref 13.5–17.5)
HGB BLDV-MCNC: 12.8 G/DL (ref 13.5–17.5)
IMM GRANULOCYTES # BLD AUTO: 0.08 X10*3/UL (ref 0–0.5)
IMM GRANULOCYTES NFR BLD AUTO: 1 % (ref 0–0.9)
INHALED O2 CONCENTRATION: 21 %
LACTATE BLDV-SCNC: 1.7 MMOL/L (ref 0.4–2)
LYMPHOCYTES # BLD AUTO: 0.23 X10*3/UL (ref 0.8–3)
LYMPHOCYTES NFR BLD AUTO: 2.8 %
MCH RBC QN AUTO: 28.8 PG (ref 26–34)
MCHC RBC AUTO-ENTMCNC: 31.5 G/DL (ref 32–36)
MCV RBC AUTO: 92 FL (ref 80–100)
MONOCYTES # BLD AUTO: 0.6 X10*3/UL (ref 0.05–0.8)
MONOCYTES NFR BLD AUTO: 7.3 %
NEUTROPHILS # BLD AUTO: 7.19 X10*3/UL (ref 1.6–5.5)
NEUTROPHILS NFR BLD AUTO: 87.6 %
NRBC BLD-RTO: 0 /100 WBCS (ref 0–0)
OXYHGB MFR BLDV: 52.9 % (ref 45–75)
P AXIS: -31 DEGREES
P OFFSET: 218 MS
P ONSET: 156 MS
PCO2 BLDV: 42 MM HG (ref 41–51)
PH BLDV: 7.44 PH (ref 7.33–7.43)
PLATELET # BLD AUTO: 127 X10*3/UL (ref 150–450)
PO2 BLDV: 32 MM HG (ref 35–45)
POTASSIUM BLDV-SCNC: 4.4 MMOL/L (ref 3.5–5.3)
POTASSIUM SERPL-SCNC: 4.3 MMOL/L (ref 3.5–5.3)
PR INTERVAL: 138 MS
Q ONSET: 225 MS
QRS COUNT: 10 BEATS
QRS DURATION: 94 MS
QT INTERVAL: 418 MS
QTC CALCULATION(BAZETT): 424 MS
QTC FREDERICIA: 422 MS
R AXIS: 14 DEGREES
RBC # BLD AUTO: 4.23 X10*6/UL (ref 4.5–5.9)
RH FACTOR (ANTIGEN D): NORMAL
RH FACTOR (ANTIGEN D): NORMAL
SAO2 % BLDV: 54 % (ref 45–75)
SODIUM BLDV-SCNC: 131 MMOL/L (ref 136–145)
SODIUM SERPL-SCNC: 135 MMOL/L (ref 136–145)
T AXIS: 39 DEGREES
T OFFSET: 434 MS
VENTRICULAR RATE: 62 BPM
WBC # BLD AUTO: 8.2 X10*3/UL (ref 4.4–11.3)

## 2025-07-23 PROCEDURE — 99285 EMERGENCY DEPT VISIT HI MDM: CPT | Mod: 25 | Performed by: EMERGENCY MEDICINE

## 2025-07-23 PROCEDURE — 74176 CT ABD & PELVIS W/O CONTRAST: CPT | Mod: 59

## 2025-07-23 PROCEDURE — 2500000004 HC RX 250 GENERAL PHARMACY W/ HCPCS (ALT 636 FOR OP/ED): Performed by: EMERGENCY MEDICINE

## 2025-07-23 PROCEDURE — 85025 COMPLETE CBC W/AUTO DIFF WBC: CPT | Performed by: EMERGENCY MEDICINE

## 2025-07-23 PROCEDURE — 96374 THER/PROPH/DIAG INJ IV PUSH: CPT | Mod: 59

## 2025-07-23 PROCEDURE — 36415 COLL VENOUS BLD VENIPUNCTURE: CPT | Performed by: EMERGENCY MEDICINE

## 2025-07-23 PROCEDURE — 71275 CT ANGIOGRAPHY CHEST: CPT

## 2025-07-23 PROCEDURE — 74176 CT ABD & PELVIS W/O CONTRAST: CPT | Mod: FOREIGN READ | Performed by: RADIOLOGY

## 2025-07-23 PROCEDURE — 84132 ASSAY OF SERUM POTASSIUM: CPT | Performed by: EMERGENCY MEDICINE

## 2025-07-23 PROCEDURE — 93005 ELECTROCARDIOGRAM TRACING: CPT

## 2025-07-23 PROCEDURE — 2550000001 HC RX 255 CONTRASTS: Performed by: EMERGENCY MEDICINE

## 2025-07-23 PROCEDURE — 86901 BLOOD TYPING SEROLOGIC RH(D): CPT | Performed by: EMERGENCY MEDICINE

## 2025-07-23 PROCEDURE — 96361 HYDRATE IV INFUSION ADD-ON: CPT

## 2025-07-23 PROCEDURE — 71275 CT ANGIOGRAPHY CHEST: CPT | Mod: FOREIGN READ | Performed by: RADIOLOGY

## 2025-07-23 PROCEDURE — 84484 ASSAY OF TROPONIN QUANT: CPT | Performed by: EMERGENCY MEDICINE

## 2025-07-23 PROCEDURE — 74174 CTA ABD&PLVS W/CONTRAST: CPT | Mod: FOREIGN READ | Performed by: RADIOLOGY

## 2025-07-23 RX ORDER — ONDANSETRON HYDROCHLORIDE 2 MG/ML
4 INJECTION, SOLUTION INTRAVENOUS ONCE
Status: COMPLETED | OUTPATIENT
Start: 2025-07-23 | End: 2025-07-23

## 2025-07-23 RX ADMIN — IOHEXOL 75 ML: 350 INJECTION, SOLUTION INTRAVENOUS at 07:49

## 2025-07-23 RX ADMIN — SODIUM CHLORIDE 1000 ML: 900 INJECTION, SOLUTION INTRAVENOUS at 05:09

## 2025-07-23 RX ADMIN — ONDANSETRON 4 MG: 2 INJECTION, SOLUTION INTRAMUSCULAR; INTRAVENOUS at 05:09

## 2025-07-23 ASSESSMENT — PAIN DESCRIPTION - PROGRESSION: CLINICAL_PROGRESSION: NOT CHANGED

## 2025-07-23 ASSESSMENT — PAIN - FUNCTIONAL ASSESSMENT: PAIN_FUNCTIONAL_ASSESSMENT: 0-10

## 2025-07-23 NOTE — DISCHARGE INSTRUCTIONS
Thank you for allowing us to care for you today.  You may receive a survey regarding your visit today.    If you were satisfied with the care and service provided we would be very grateful if you would give us a high rating.    Your feedback is very important to us.    Dr. Torrez        As of today's visit, based on reasonable likelihood, that it is safe for you to be discharged back to your residence to follow-up as an outpatient for ongoing management of your medical problem. You should follow-up with any referrals / primary provider as soon as possible. The contacts (number, addresses) are listed below.         Important:  Even though we think it is safe for you to go home, there is always a small chance that we are missing something that could require hospitalization.  Therefore it is very important that if you get worse or develops any new symptoms that you return here as soon as possible to be re-evaluated.  This includes return of symptoms that have resolved such as fainting, chest pain, or symptoms that could be warning signs for stroke important:  Even though we think it is safe for you to go home, there is always a small chance that we are missing something that could require hospitalization.  Therefore it is very important that if you get worse or develops any new symptoms that you return here as soon as possible to be re-evaluated.  This includes return of symptoms that have resolved such as fainting, chest pain, or symptoms that could be warning signs for stroke         Make sure your pharmacy and primary doctor is aware of any new medications prescribed today.          It is your responsibility to contact as soon as possible, and follow through with, any referrals you were given today. We do recommend you inform them you are a [facility] follow-up patient, as often they can better accommodate your need to be seen, provided their schedules allow. We will, and have, made every effort to ensure you have  access to adequate follow-up specialists available.          All problems may not be able to be fixed in one ER visit. This is why timely ongoing care is important, and this is a responsibility you share in. Further, you are free to follow up with any provider you choose, and this is not limited to our suggestion.          If cultures were obtained today, you will be contacted should anything result that would require further treatment. Please contact the ED at the number provided with questions.          Having trouble affording medications? Try [email or web address]! (This is not a hospital endorsed website, merely a recommendation based on my own personal experiences with [facility])

## 2025-07-23 NOTE — ED PROVIDER NOTES
HPI   Chief Complaint   Patient presents with    Medication Reaction       HPI  74-year-old male presents with not feeling well.  Patient was diagnosed with urinary tract infection yesterday and despite him being on Coumadin was prescribed Bactrim.  Now he feels shaky and weak.  No chest pain or shortness of breath or dizziness lightheadedness or nausea or vomiting.  No known fevers but he states he felt warm.  No other complaints.      Patient History   Medical History[1]  Surgical History[2]  Family History[3]  Social History[4]    Physical Exam   ED Triage Vitals   Temperature Heart Rate Respirations BP   07/23/25 0424 07/23/25 0426 07/23/25 0424 07/23/25 0426   36.7 °C (98.1 °F) 64 18 159/83      Pulse Ox Temp src Heart Rate Source Patient Position   07/23/25 0426 -- -- --   97 %         BP Location FiO2 (%)     -- --             Physical Exam  General:  Awake, alert, no acute distress.  Head: Normocephalic, Atraumatic  Neck: Supple, trachea midline, no stridor  Skin: Warm and dry, no rashes   Lungs: Clear to auscultation bilaterally no acute respiratory distress, speaking in full sentences without difficulty  CV: Regular Rate Rhythm with no obvious murmurs gallops rubs noted, no jugular venous distention, no pedal edema   Abdomen: Soft, nontender, nondistended, positive bowel sounds, no peritoneal signs  Neuro:  No gross focal neurologic deficits, NIH is 0  Musculoskeletal:  Full range of motion in all 4 extremities  Psychiatric:  Alert oriented x 3, Good insight into condition.    ED Course & MDM   ED Course as of 07/25/25 1831 Wed Jul 23, 2025   1027 Patient was seen and examined, signed out to me in stable condition.  Patient noted to have findings of aortic concerns on CT without contrast.  Performed CT angio of the chest abdomen pelvis which redemonstrated findings that were concerning, placed consult order for vascular surgery while waiting to hear back the family did not want to wait any longer.   Despite attempts to repage.  Patient wants to sign out AMA.  I had a discussion with the patient, and despite shared decision-making, the patient still refused to stay.  Noted that patient should stay to have further evaluation.  Patient is alert and oriented x3, not hallucinating, not suicidal/homicidal, and of sound mind to make their own medical decision.  I did advise patient of the risk of signing out AGAINST MEDICAL ADVICE and that full investigation could not be performed or completed and that there may still be something serious that we are missing.  Advised patient that this could lead to loss of current lifestyle, chronic pain, chronic organ failure, as well as leading up to death.  Patient understood these risks, and agreed to accept full responsibility for the choice that they were making in regards to their healthcare, treatment and their future in regards to making a decision to sign out AMA.  Nursing and myself pleaded and begged with the patient to stay without coercion, and after presenting the patient with all relevant information they still wanted to leave AGAINST MEDICAL ADVICE.  Patient is therefore signed out AGAINST MEDICAL ADVICE with instructions to follow-up with her provider as soon as possible or return to the ER if develop new symptoms, worsening symptoms, or change of mind/intent. [ML]      ED Course User Index  [ML] Marty R Lejeune, DO         Diagnoses as of 07/25/25 1831   Weakness   Adverse effect of drug, initial encounter   Aortitis                 No data recorded     [place] Coma Scale Score: 15 (07/23/25 0512 : Richard Napier, RN)       NIH Stroke Scale: 0 (07/23/25 0513 : Richard Napier, SHANITA)                   Medical Decision Making  My EKG interpretation at 0 534:  Sinus rhythm 62 bpm normal axis  QTc 424 no ectopy or acute ischemic changes noted    Patient's symptoms may be secondary to his Bactrim.  Laboratory studies are ordered and pending at time of  dictation.    Patient feels better with treatment in the ED.  His workup is unremarkable.  I advised him to stop taking the Bactrim as his urine really did not show evidence of infection anyway.  Follow-up with his doctor but to return if his condition worsens.    Prior to discharge the patient's daughter arrived and voiced concerns the patient may have a kidney stone.  In light of this I ordered an abdominal CT.    Patient endorsed to the oncoming physician    Procedure  Procedures       Andre Jamessamanta, DO  07/23/25 0526       Andre Jamessamanta, DO  07/23/25 0542       Andre Torrez, DO  07/23/25 0556         Andre Torrez, DO  07/23/25 0619       [1]   Past Medical History:  Diagnosis Date    Aneurysm     Atrial fibrillation (Multi)     Clotting disorder (Multi)     Hypertension    [2]   Past Surgical History:  Procedure Laterality Date    ARTERIAL ANEURYSM REPAIR      CATARACT EXTRACTION  06/23/2016    Cataract Surgery    COLONOSCOPY  06/23/2016    Complete Colonoscopy    CT ABDOMEN PELVIS ANGIOGRAM W AND/OR WO IV CONTRAST  11/04/2020    CT ABDOMEN PELVIS ANGIOGRAM W AND/OR WO IV CONTRAST [facility]    HEMORRHOID SURGERY  06/23/2016    Hemorrhoidectomy    OTHER SURGICAL HISTORY  03/28/2022    Abdominal aortic aneurysm repair   [3]   Family History  Problem Relation Name Age of Onset    Alzheimer's disease Mother      Liver cancer Other SIBLINGS    [4]   Social History  Tobacco Use    Smoking status: Former     Types: Cigarettes     Passive exposure: Past    Smokeless tobacco: Never    Tobacco comments:     Pt quit about 10 years ago   Vaping Use    Vaping status: Never Used   Substance Use Topics    Alcohol use: Not Currently    Drug use: Never        Andre IVY DO Lore  07/25/25 7129

## 2025-07-23 NOTE — ED PROVIDER NOTES
Emergency Department Transition of care/ Progress Note         Assumed care of patient that was signed out to me in stable condition with work-up /disposition pending.    History/Exam limitations: none.   Additional history was obtained from past medical records.    HPI: Anderson Barlow  is a 74 y.o. with a complaint of   Chief Complaint   Patient presents with    Medication Reaction     Past medical history and surgical history reviewed and as documented.  History reviewed and as noted. past medical records reviewed.    Past medical records, triage/nursing notes and vital signs reviewed as available and as noted above.    PHYSICAL EXAM  Vital Signs reviewed and noted to be within normal limits. the patient is not hypoxic.  -General: Alert, no acute distress, patient resting comfortably  -Skin: warm, intact, no pallor noted  -Head: Normocephalic, atraumatic  -Eye: Normal conjunctiva  -Cardiac: Normal peripheral perfusion  -Respiratory: No acute distress  -Musculoskeletal: No deformity  -Neurological: alert and oriented, normal sensory and motor observed.  -Psychiatric: Cooperative        Labs and imaging reviewed by me  and note     Labs Reviewed   CBC WITH AUTO DIFFERENTIAL - Abnormal       Result Value    WBC 8.2      nRBC 0.0      RBC 4.23 (*)     Hemoglobin 12.2 (*)     Hematocrit 38.7 (*)     MCV 92      MCH 28.8      MCHC 31.5 (*)     RDW 15.6 (*)     Platelets 127 (*)     Neutrophils % 87.6      Immature Granulocytes %, Automated 1.0 (*)     Lymphocytes % 2.8      Monocytes % 7.3      Eosinophils % 0.4      Basophils % 0.9      Neutrophils Absolute 7.19 (*)     Immature Granulocytes Absolute, Automated 0.08      Lymphocytes Absolute 0.23 (*)     Monocytes Absolute 0.60      Eosinophils Absolute 0.03      Basophils Absolute 0.07     BASIC METABOLIC PANEL - Abnormal    Glucose 108 (*)     Sodium 135 (*)     Potassium 4.3      Chloride 102      Bicarbonate 26      Anion Gap 11      Urea Nitrogen 11       Creatinine 1.13      eGFR 67      Calcium 8.4 (*)    BLOOD GAS VENOUS FULL PANEL - Abnormal    POCT pH, Venous 7.44 (*)     POCT pCO2, Venous 42      POCT pO2, Venous 32 (*)     POCT SO2, Venous 54      POCT Oxy Hemoglobin, Venous 52.9      POCT Hematocrit Calculated, Venous 38.0 (*)     POCT Sodium, Venous 131 (*)     POCT Potassium, Venous 4.4      POCT Chloride, Venous 101      POCT Ionized Calicum, Venous 1.17      POCT Glucose, Venous 109 (*)     POCT Lactate, Venous 1.7      POCT Base Excess, Venous 3.9 (*)     POCT HCO3 Calculated, Venous 28.5 (*)     POCT Hemoglobin, Venous 12.8 (*)     POCT Anion Gap, Venous 6.0 (*)     Patient Temperature 37.0      FiO2 21     SERIAL TROPONIN-INITIAL - Normal    Troponin I, High Sensitivity 7      Narrative:     Less than 99th percentile of normal range cutoff-  Female and children under 18 years old <14 ng/L; Male <21 ng/L: Negative  Repeat testing should be performed if clinically indicated.     Female and children under 18 years old 14-50 ng/L; Male 21-50 ng/L:  Consistent with possible cardiac damage and possible increased clinical   risk. Serial measurements may help to assess extent of myocardial damage.     >50 ng/L: Consistent with cardiac damage, increased clinical risk and  myocardial infarction. Serial measurements may help assess extent of   myocardial damage.      NOTE: Children less than 1 year old may have higher baseline troponin   levels and results should be interpreted in conjunction with the overall   clinical context.     NOTE: Troponin I testing is performed using a different   testing methodology at [facility] than at other   [facility]. Direct result comparisons should only   be made within the same method.   SERIAL TROPONIN, 1 HOUR - Normal    Troponin I, High Sensitivity 7      Narrative:     Less than 99th percentile of normal range cutoff-  Female and children under 18 years old <14 ng/L; Male <21 ng/L: Negative  Repeat  testing should be performed if clinically indicated.     Female and children under 18 years old 14-50 ng/L; Male 21-50 ng/L:  Consistent with possible cardiac damage and possible increased clinical   risk. Serial measurements may help to assess extent of myocardial damage.     >50 ng/L: Consistent with cardiac damage, increased clinical risk and  myocardial infarction. Serial measurements may help assess extent of   myocardial damage.      NOTE: Children less than 1 year old may have higher baseline troponin   levels and results should be interpreted in conjunction with the overall   clinical context.     NOTE: Troponin I testing is performed using a different   testing methodology at [facility] than at other   [facility]. Direct result comparisons should only   be made within the same method.   TROPONIN SERIES- (INITIAL, 1 HR)    Narrative:     The following orders were created for panel order Troponin I Series, High Sensitivity (0, 1 HR).  Procedure                               Abnormality         Status                     ---------                               -----------         ------                     Troponin I, High Sensiti...[[id number]]  Normal              Final result               Troponin, High Sensitivi...[[id number]]  Normal              Final result                 Please view results for these tests on the individual orders.   TYPE AND SCREEN    ABO TYPE O      Rh TYPE POS      ANTIBODY SCREEN NEG     VERAB/VERIFY ABORH    ABO TYPE O      Rh TYPE POS        CT angio chest abdomen pelvis   Final Result   1.Postsurgical changes of a prior aortic repair from level of the   renal arteries to the proximal iliac vessels with mild periaortic   stranding and some accompanying soft tissue. Short segment dissection   along the right lateral wall of the descending aorta at the level of   the proximal repair on axial presumably chronic. Additional area of   penetrating ulceration along  the anterior wall of the aorta as   detailed above. Mild aneurysmal dilatation of the infrarenal abdominal   aorta measuring 3.4 cm.   2.1 cm segment of dissection of the proximal left renal artery   versus penetrating ulceration, although the remainder of the left   renal artery is widely patent.   3.Mild dilatation of the ascending aorta measuring 4.3 cm in   greatest AP dimension.   4.Coronary artery calcifications.   5.Few scattered small pulmonary nodules measuring up to 6 mm in   the right middle lobe on 192 and 6 cm in the left lower lobe.   Non-contrast chest CT at 3-6 months is recommended. If the nodules are   stable at time of repeat CT, then future CT at 18-24 months (from   today's scan) is considered optional for low-risk patients, but is   recommended for high-risk patients (Per Fleischner Society   guidelines).   6.Mild hepatic steatosis.   7.Colonic diverticulosis without findings of diverticulitis.   Signed by Benigno Gonzalez MD      CT abdomen pelvis wo IV contrast   Final Result   Addendum (preliminary) 1 of 1   NOTIFICATION:  The results of the study were discussed with, and   acknowledged by Dr. Lejeune, Marty, by telephone on 7/23/2025 at 0710   hours.   Signed by Merrill Riddle MD      Final   No urinary tract stones, hydronephrosis or hydroureter.   The abdominal aorta is mildly aneurysmal with a lobulated, indistinct   wall, some adjacent retroperitoneal fat stranding and some mildly   enlarged retroperitoneal lymph nodes.  This may reflect chronic   sequela of prior aneurysm and possible repair as there are surgical   clips noted in the area.  Other possibilities include a mycotic   aneurysm or possible contained rupture/pseudoaneurysm.  Correlation   with any available prior imaging and vascular surgical history would   be useful.  If this is a new finding recommend further investigation   with CT angiogram or a contrast enhanced MR angiogram.   Signed by Merrill Riddle MD                    Procedure  Procedures    Medications   sodium chloride 0.9 % bolus 1,000 mL (0 mL intravenous Stopped 7/23/25 0609)   ondansetron (Zofran) injection 4 mg (4 mg intravenous Given 7/23/25 0509)   iohexol (OMNIPaque) 350 mg iodine/mL solution 75 mL (75 mL intravenous Given 7/23/25 0749)        ED Course as of 07/23/25 1028   Wed Jul 23, 2025   1027 Patient was seen and examined, signed out to me in stable condition.  Patient noted to have findings of aortic concerns on CT without contrast.  Performed CT angio of the chest abdomen pelvis which redemonstrated findings that were concerning, placed consult order for vascular surgery while waiting to hear back the family did not want to wait any longer.  Despite attempts to repage.  Patient wants to sign out AMA.  I had a discussion with the patient, and despite shared decision-making, the patient still refused to stay.  Noted that patient should stay to have further evaluation.  Patient is alert and oriented x3, not hallucinating, not suicidal/homicidal, and of sound mind to make their own medical decision.  I did advise patient of the risk of signing out AGAINST MEDICAL ADVICE and that full investigation could not be performed or completed and that there may still be something serious that we are missing.  Advised patient that this could lead to loss of current lifestyle, chronic pain, chronic organ failure, as well as leading up to death.  Patient understood these risks, and agreed to accept full responsibility for the choice that they were making in regards to their healthcare, treatment and their future in regards to making a decision to sign out AMA.  Nursing and myself pleaded and begged with the patient to stay without coercion, and after presenting the patient with all relevant information they still wanted to leave AGAINST MEDICAL ADVICE.  Patient is therefore signed out AGAINST MEDICAL ADVICE with instructions to follow-up with her provider as soon as  possible or return to the ER if develop new symptoms, worsening symptoms, or change of mind/intent. [ML]      ED Course User Index  [ML] Marty R Lejeune, DO         Diagnoses as of 07/23/25 1028   Weakness   Adverse effect of drug, initial encounter   Aortitis        1. Weakness        2. Adverse effect of drug, initial encounter        3. Aortitis             DISPOSITION: AGAINST MEDICAL ADVICE    Marty LeJeune, DO  Internal & Emergency Medicine  10:28 AM   07/23/25        Marty R Lejeune, DO  Resident  07/23/25 1029

## 2025-07-24 LAB — BACTERIA UR CULT: NORMAL

## 2025-07-29 DIAGNOSIS — I27.20 PULMONARY HYPERTENSION (MULTI): ICD-10-CM

## 2025-07-29 DIAGNOSIS — I10 PRIMARY HYPERTENSION: ICD-10-CM

## 2025-07-29 RX ORDER — LOSARTAN POTASSIUM 50 MG/1
50 TABLET ORAL
Qty: 90 TABLET | Refills: 3 | Status: SHIPPED | OUTPATIENT
Start: 2025-07-29

## 2025-08-04 ENCOUNTER — ANTICOAGULATION - WARFARIN VISIT (OUTPATIENT)
Dept: CARDIOLOGY | Facility: CLINIC | Age: 75
End: 2025-08-04
Payer: MEDICARE

## 2025-08-04 DIAGNOSIS — I48.0 PAROXYSMAL ATRIAL FIBRILLATION (MULTI): Primary | ICD-10-CM

## 2025-08-04 LAB
POC INR: 2.1 (ref 0.9–1.1)
POC PROTHROMBIN TIME: ABNORMAL (ref 9.3–12.5)

## 2025-08-04 PROCEDURE — 85610 PROTHROMBIN TIME: CPT | Mod: QW | Performed by: INTERNAL MEDICINE

## 2025-08-04 PROCEDURE — 99211 OFF/OP EST MAY X REQ PHY/QHP: CPT

## 2025-08-04 NOTE — PROGRESS NOTES
Patient identification verified with 2 identifiers.    Location: [facility] - [address] [place] [id number] [phone]     Referring Physician: Dr. Abi Gonsalves  Enrollment/ Re-enrollment date: 2026   INR Goal: 2.0-3.0  INR monitoring is per [facility] protocol.  Anticoagulation Medication: warfarin  Indication: Atrial Fibrillation/Atrial Flutter    Subjective   Bleeding signs/symptoms: No    Bruising: No   Major bleeding event: No  Thrombosis signs/symptoms: No  Thromboembolic event: No  Missed doses: No  Extra doses: No  Medication changes: No  Dietary changes: No  Change in health: No  Change in activity: No  Alcohol: No  Other concerns: No    Upcoming Procedures:  Does the Patient Have any upcoming procedures that require interruption in anticoagulation therapy? no  Does the patient require bridging? no      Anticoagulation Summary  As of 2025      INR goal:  2.0-3.0   TTR:  84.4% (1.3 y)   INR used for dosin.10 (2025)   Weekly warfarin total:  12.5 mg               Assessment/Plan   Therapeutic     1. New dose: no change    2. Next INR: 5 weeks due to holiday.      Education provided to patient during the visit:  Patient instructed to call in interim with questions, concerns and changes.   Patient educated on interactions between medications and warfarin.   Patient educated on dietary consistency in vitamin k consumption.   Patient educated on affects of alcohol consumption while taking warfarin.   Patient educated on signs of bleeding/clotting.   Patient educated on compliance with dosing, follow up appointments, and prescribed plan of care.

## 2025-08-25 ENCOUNTER — HOSPITAL ENCOUNTER (EMERGENCY)
Facility: HOSPITAL | Age: 75
Discharge: HOME | End: 2025-08-25
Attending: STUDENT IN AN ORGANIZED HEALTH CARE EDUCATION/TRAINING PROGRAM
Payer: MEDICARE

## 2025-08-25 VITALS
BODY MASS INDEX: 32.21 KG/M2 | DIASTOLIC BLOOD PRESSURE: 88 MMHG | SYSTOLIC BLOOD PRESSURE: 177 MMHG | HEART RATE: 73 BPM | TEMPERATURE: 97.9 F | RESPIRATION RATE: 18 BRPM | HEIGHT: 70 IN | OXYGEN SATURATION: 98 % | WEIGHT: 225 LBS

## 2025-08-25 DIAGNOSIS — R04.0 EPISTAXIS: Primary | ICD-10-CM

## 2025-08-25 LAB
BASOPHILS # BLD AUTO: 0.09 X10*3/UL (ref 0–0.1)
BASOPHILS NFR BLD AUTO: 1.2 %
EOSINOPHIL # BLD AUTO: 0.08 X10*3/UL (ref 0–0.4)
EOSINOPHIL NFR BLD AUTO: 1.1 %
ERYTHROCYTE [DISTWIDTH] IN BLOOD BY AUTOMATED COUNT: 16.3 % (ref 11.5–14.5)
HCT VFR BLD AUTO: 42.9 % (ref 41–52)
HGB BLD-MCNC: 13.5 G/DL (ref 13.5–17.5)
IMM GRANULOCYTES # BLD AUTO: 0.05 X10*3/UL (ref 0–0.5)
IMM GRANULOCYTES NFR BLD AUTO: 0.7 % (ref 0–0.9)
INR PPP: 1.8 (ref 0.9–1.2)
LYMPHOCYTES # BLD AUTO: 0.51 X10*3/UL (ref 0.8–3)
LYMPHOCYTES NFR BLD AUTO: 6.8 %
MCH RBC QN AUTO: 28.8 PG (ref 26–34)
MCHC RBC AUTO-ENTMCNC: 31.5 G/DL (ref 32–36)
MCV RBC AUTO: 92 FL (ref 80–100)
MONOCYTES # BLD AUTO: 0.77 X10*3/UL (ref 0.05–0.8)
MONOCYTES NFR BLD AUTO: 10.3 %
NEUTROPHILS # BLD AUTO: 5.97 X10*3/UL (ref 1.6–5.5)
NEUTROPHILS NFR BLD AUTO: 79.9 %
NRBC BLD-RTO: 0 /100 WBCS (ref 0–0)
PLATELET # BLD AUTO: 197 X10*3/UL (ref 150–450)
PROTHROMBIN TIME: 18.8 SECONDS (ref 9.3–12.7)
RBC # BLD AUTO: 4.69 X10*6/UL (ref 4.5–5.9)
WBC # BLD AUTO: 7.5 X10*3/UL (ref 4.4–11.3)

## 2025-08-25 PROCEDURE — 85025 COMPLETE CBC W/AUTO DIFF WBC: CPT | Performed by: STUDENT IN AN ORGANIZED HEALTH CARE EDUCATION/TRAINING PROGRAM

## 2025-08-25 PROCEDURE — 36415 COLL VENOUS BLD VENIPUNCTURE: CPT | Performed by: STUDENT IN AN ORGANIZED HEALTH CARE EDUCATION/TRAINING PROGRAM

## 2025-08-25 PROCEDURE — 85610 PROTHROMBIN TIME: CPT | Performed by: STUDENT IN AN ORGANIZED HEALTH CARE EDUCATION/TRAINING PROGRAM

## 2025-08-25 PROCEDURE — 99283 EMERGENCY DEPT VISIT LOW MDM: CPT | Performed by: STUDENT IN AN ORGANIZED HEALTH CARE EDUCATION/TRAINING PROGRAM

## 2025-08-25 RX ORDER — LIDOCAINE HYDROCHLORIDE AND EPINEPHRINE 10; 10 UG/ML; MG/ML
10 INJECTION, SOLUTION INFILTRATION; PERINEURAL ONCE
Status: DISCONTINUED | OUTPATIENT
Start: 2025-08-25 | End: 2025-08-25

## 2025-08-25 RX ORDER — TRANEXAMIC ACID 1 G/10ML
1000 INJECTION, SOLUTION INTRAVENOUS ONCE
Status: DISCONTINUED | OUTPATIENT
Start: 2025-08-25 | End: 2025-08-25

## 2025-08-25 RX ORDER — OXYMETAZOLINE HCL 0.05 %
15 SPRAY, NON-AEROSOL (ML) NASAL ONCE
Status: DISCONTINUED | OUTPATIENT
Start: 2025-08-25 | End: 2025-08-25

## 2025-08-25 ASSESSMENT — PAIN - FUNCTIONAL ASSESSMENT: PAIN_FUNCTIONAL_ASSESSMENT: 0-10

## 2025-08-25 ASSESSMENT — PAIN SCALES - GENERAL: PAINLEVEL_OUTOF10: 0 - NO PAIN

## 2025-08-28 ENCOUNTER — HOSPITAL ENCOUNTER (EMERGENCY)
Facility: HOSPITAL | Age: 75
Discharge: HOME | End: 2025-08-28
Attending: STUDENT IN AN ORGANIZED HEALTH CARE EDUCATION/TRAINING PROGRAM
Payer: MEDICARE

## 2025-08-28 ENCOUNTER — APPOINTMENT (OUTPATIENT)
Dept: CARDIOLOGY | Facility: HOSPITAL | Age: 75
End: 2025-08-28
Payer: MEDICARE

## 2025-08-28 VITALS
HEIGHT: 70 IN | SYSTOLIC BLOOD PRESSURE: 136 MMHG | RESPIRATION RATE: 18 BRPM | TEMPERATURE: 99 F | HEART RATE: 90 BPM | DIASTOLIC BLOOD PRESSURE: 90 MMHG | OXYGEN SATURATION: 98 % | BODY MASS INDEX: 32.21 KG/M2 | WEIGHT: 225 LBS

## 2025-08-28 DIAGNOSIS — I49.1 PAC (PREMATURE ATRIAL CONTRACTION): Primary | ICD-10-CM

## 2025-08-28 LAB
ALBUMIN SERPL BCP-MCNC: 3.9 G/DL (ref 3.4–5)
ALP SERPL-CCNC: 99 U/L (ref 33–136)
ALT SERPL W P-5'-P-CCNC: 12 U/L (ref 10–52)
ANION GAP SERPL CALCULATED.3IONS-SCNC: 10 MMOL/L (ref 10–20)
AST SERPL W P-5'-P-CCNC: 13 U/L (ref 9–39)
BASOPHILS # BLD AUTO: 0.1 X10*3/UL (ref 0–0.1)
BASOPHILS NFR BLD AUTO: 1.5 %
BILIRUB SERPL-MCNC: 0.6 MG/DL (ref 0–1.2)
BUN SERPL-MCNC: 16 MG/DL (ref 6–23)
CALCIUM SERPL-MCNC: 8.6 MG/DL (ref 8.6–10.3)
CARDIAC TROPONIN I PNL SERPL HS: 5 NG/L (ref 0–20)
CARDIAC TROPONIN I PNL SERPL HS: 5 NG/L (ref 0–20)
CHLORIDE SERPL-SCNC: 104 MMOL/L (ref 98–107)
CO2 SERPL-SCNC: 28 MMOL/L (ref 21–32)
CREAT SERPL-MCNC: 1 MG/DL (ref 0.5–1.3)
EGFRCR SERPLBLD CKD-EPI 2021: 78 ML/MIN/1.73M*2
EOSINOPHIL # BLD AUTO: 0.09 X10*3/UL (ref 0–0.4)
EOSINOPHIL NFR BLD AUTO: 1.4 %
ERYTHROCYTE [DISTWIDTH] IN BLOOD BY AUTOMATED COUNT: 15.9 % (ref 11.5–14.5)
GLUCOSE SERPL-MCNC: 116 MG/DL (ref 74–99)
HCT VFR BLD AUTO: 41.2 % (ref 41–52)
HGB BLD-MCNC: 13.5 G/DL (ref 13.5–17.5)
IMM GRANULOCYTES # BLD AUTO: 0.07 X10*3/UL (ref 0–0.5)
IMM GRANULOCYTES NFR BLD AUTO: 1.1 % (ref 0–0.9)
LYMPHOCYTES # BLD AUTO: 0.92 X10*3/UL (ref 0.8–3)
LYMPHOCYTES NFR BLD AUTO: 13.8 %
MCH RBC QN AUTO: 29.8 PG (ref 26–34)
MCHC RBC AUTO-ENTMCNC: 32.8 G/DL (ref 32–36)
MCV RBC AUTO: 91 FL (ref 80–100)
MONOCYTES # BLD AUTO: 0.78 X10*3/UL (ref 0.05–0.8)
MONOCYTES NFR BLD AUTO: 11.7 %
NEUTROPHILS # BLD AUTO: 4.7 X10*3/UL (ref 1.6–5.5)
NEUTROPHILS NFR BLD AUTO: 70.5 %
NRBC BLD-RTO: 0 /100 WBCS (ref 0–0)
PLATELET # BLD AUTO: 147 X10*3/UL (ref 150–450)
POTASSIUM SERPL-SCNC: 4.1 MMOL/L (ref 3.5–5.3)
PROT SERPL-MCNC: 6.3 G/DL (ref 6.4–8.2)
RBC # BLD AUTO: 4.53 X10*6/UL (ref 4.5–5.9)
SODIUM SERPL-SCNC: 138 MMOL/L (ref 136–145)
WBC # BLD AUTO: 6.7 X10*3/UL (ref 4.4–11.3)

## 2025-08-28 PROCEDURE — 85025 COMPLETE CBC W/AUTO DIFF WBC: CPT | Performed by: STUDENT IN AN ORGANIZED HEALTH CARE EDUCATION/TRAINING PROGRAM

## 2025-08-28 PROCEDURE — 36415 COLL VENOUS BLD VENIPUNCTURE: CPT | Performed by: STUDENT IN AN ORGANIZED HEALTH CARE EDUCATION/TRAINING PROGRAM

## 2025-08-28 PROCEDURE — 80053 COMPREHEN METABOLIC PANEL: CPT | Performed by: STUDENT IN AN ORGANIZED HEALTH CARE EDUCATION/TRAINING PROGRAM

## 2025-08-28 PROCEDURE — 84484 ASSAY OF TROPONIN QUANT: CPT | Performed by: STUDENT IN AN ORGANIZED HEALTH CARE EDUCATION/TRAINING PROGRAM

## 2025-08-28 PROCEDURE — 99284 EMERGENCY DEPT VISIT MOD MDM: CPT | Performed by: STUDENT IN AN ORGANIZED HEALTH CARE EDUCATION/TRAINING PROGRAM

## 2025-08-28 PROCEDURE — 93005 ELECTROCARDIOGRAM TRACING: CPT

## 2025-08-28 ASSESSMENT — PAIN - FUNCTIONAL ASSESSMENT
PAIN_FUNCTIONAL_ASSESSMENT: 0-10
PAIN_FUNCTIONAL_ASSESSMENT: 0-10

## 2025-08-28 ASSESSMENT — PAIN SCALES - GENERAL: PAINLEVEL_OUTOF10: 0 - NO PAIN

## 2025-08-29 ENCOUNTER — TELEPHONE (OUTPATIENT)
Facility: CLINIC | Age: 75
End: 2025-08-29
Payer: MEDICARE

## 2025-08-29 LAB
ATRIAL RATE: 108 BPM
Q ONSET: 222 MS
QRS COUNT: 17 BEATS
QRS DURATION: 100 MS
QT INTERVAL: 372 MS
QTC CALCULATION(BAZETT): 494 MS
QTC FREDERICIA: 449 MS
R AXIS: 0 DEGREES
T AXIS: -8 DEGREES
T OFFSET: 408 MS
VENTRICULAR RATE: 106 BPM